# Patient Record
Sex: FEMALE | Race: WHITE
[De-identification: names, ages, dates, MRNs, and addresses within clinical notes are randomized per-mention and may not be internally consistent; named-entity substitution may affect disease eponyms.]

---

## 2017-02-24 ENCOUNTER — HOSPITAL ENCOUNTER (OUTPATIENT)
Dept: HOSPITAL 53 - M LAB | Age: 82
End: 2017-02-24
Attending: PHYSICIAN ASSISTANT
Payer: MEDICARE

## 2017-02-24 DIAGNOSIS — R60.0: Primary | ICD-10-CM

## 2017-02-24 LAB
ALBUMIN SERPL BCG-MCNC: 3.6 GM/DL (ref 3.2–5.2)
ANION GAP SERPL CALC-SCNC: 8 MEQ/L (ref 8–16)
BUN SERPL-MCNC: 29 MG/DL (ref 7–18)
CALCIUM SERPL-MCNC: 8.8 MG/DL (ref 8.8–10.2)
CHLORIDE SERPL-SCNC: 105 MEQ/L (ref 98–107)
CO2 SERPL-SCNC: 28 MEQ/L (ref 21–32)
CREAT SERPL-MCNC: 0.85 MG/DL (ref 0.55–1.02)
ERYTHROCYTE [DISTWIDTH] IN BLOOD BY AUTOMATED COUNT: 13.3 % (ref 11.5–14.5)
GFR SERPL CREATININE-BSD FRML MDRD: > 60 ML/MIN/{1.73_M2} (ref 32–?)
GLUCOSE SERPL-MCNC: 221 MG/DL (ref 83–110)
MCH RBC QN AUTO: 30.1 PG (ref 27–33)
MCHC RBC AUTO-ENTMCNC: 32 G/DL (ref 32–36.5)
MCV RBC AUTO: 94.1 FL (ref 80–96)
PHOSPHATE SERPL-MCNC: 3.5 MG/DL (ref 2.5–4.9)
PLATELET # BLD AUTO: 163 K/MM3 (ref 150–450)
POTASSIUM SERPL-SCNC: 4.4 MEQ/L (ref 3.5–5.1)
SODIUM SERPL-SCNC: 141 MEQ/L (ref 136–145)
WBC # BLD AUTO: 7.8 K/MM3 (ref 4–10)

## 2017-02-24 NOTE — REP
Clinical:  Edema .

 

Comparison: 07/12/2016 .

 

Technique:  PA and lateral.

 

Findings:

The cardiac silhouette is normal.  Evidence of prior sternotomy and CABG.  The

lung fields demonstrate diffuse chronic changes without acute consolidation,

effusion, or pneumothorax.  No definite evidence to suggest pulmonary vascular

congestion.  The skeletal structures are intact and normal.

 

Impression:

 Chronic changes.  No acute cardiopulmonary process.

 

 

Signed by

Tomasz Rollins MD 02/24/2017 01:19 P

## 2017-04-28 ENCOUNTER — HOSPITAL ENCOUNTER (OUTPATIENT)
Dept: HOSPITAL 53 - SKLAB3 | Age: 82
End: 2017-04-28
Attending: INTERNAL MEDICINE

## 2017-04-28 DIAGNOSIS — M25.551: ICD-10-CM

## 2017-04-28 DIAGNOSIS — E11.9: Primary | ICD-10-CM

## 2017-04-28 DIAGNOSIS — I10: ICD-10-CM

## 2017-04-28 LAB
ANION GAP SERPL CALC-SCNC: 8 MEQ/L (ref 8–16)
BUN SERPL-MCNC: 17 MG/DL (ref 7–18)
CALCIUM SERPL-MCNC: 8.1 MG/DL (ref 8.8–10.2)
CHLORIDE SERPL-SCNC: 102 MEQ/L (ref 98–107)
CO2 SERPL-SCNC: 28 MEQ/L (ref 21–32)
CREAT SERPL-MCNC: 0.73 MG/DL (ref 0.55–1.02)
ERYTHROCYTE [DISTWIDTH] IN BLOOD BY AUTOMATED COUNT: 14.2 % (ref 11.5–14.5)
EST. AVERAGE GLUCOSE BLD GHB EST-MCNC: 186 MG/DL (ref 60–110)
GFR SERPL CREATININE-BSD FRML MDRD: > 60 ML/MIN/{1.73_M2} (ref 32–?)
GLUCOSE SERPL-MCNC: 127 MG/DL (ref 83–110)
MCH RBC QN AUTO: 30 PG (ref 27–33)
MCHC RBC AUTO-ENTMCNC: 32.6 G/DL (ref 32–36.5)
MCV RBC AUTO: 92.1 FL (ref 80–96)
PLATELET # BLD AUTO: 235 K/MM3 (ref 150–450)
POTASSIUM SERPL-SCNC: 3.8 MEQ/L (ref 3.5–5.1)
SODIUM SERPL-SCNC: 138 MEQ/L (ref 136–145)
WBC # BLD AUTO: 7 K/MM3 (ref 4–10)

## 2017-04-28 NOTE — REP
RIGHT HIP, TWO VIEWS:

 

HISTORY:  Pain.

 

There is no acute fracture or dislocation.  There is narrowing of the joint space

with associated osteophyte formation.  There is sclerosis in the acetabulum.

 

IMPRESSION:

 

Degenerative change as described above.

 

 

Signed by

Braden Cordova MD 04/28/2017 03:50 P

## 2017-05-05 ENCOUNTER — HOSPITAL ENCOUNTER (OUTPATIENT)
Dept: HOSPITAL 53 - SKLAB3 | Age: 82
End: 2017-05-05
Attending: INTERNAL MEDICINE

## 2017-05-05 DIAGNOSIS — E11.9: ICD-10-CM

## 2017-05-05 DIAGNOSIS — I10: Primary | ICD-10-CM

## 2017-05-05 LAB
ANION GAP SERPL CALC-SCNC: 6 MEQ/L (ref 8–16)
BUN SERPL-MCNC: 23 MG/DL (ref 7–18)
CALCIUM SERPL-MCNC: 8.9 MG/DL (ref 8.8–10.2)
CHLORIDE SERPL-SCNC: 105 MEQ/L (ref 98–107)
CO2 SERPL-SCNC: 26 MEQ/L (ref 21–32)
CREAT SERPL-MCNC: 0.93 MG/DL (ref 0.55–1.02)
ERYTHROCYTE [DISTWIDTH] IN BLOOD BY AUTOMATED COUNT: 14.9 % (ref 11.5–14.5)
GFR SERPL CREATININE-BSD FRML MDRD: > 60 ML/MIN/{1.73_M2} (ref 32–?)
GLUCOSE SERPL-MCNC: 140 MG/DL (ref 83–110)
MCH RBC QN AUTO: 30.5 PG (ref 27–33)
MCHC RBC AUTO-ENTMCNC: 32.6 G/DL (ref 32–36.5)
MCV RBC AUTO: 93.7 FL (ref 80–96)
PLATELET # BLD AUTO: 236 K/MM3 (ref 150–450)
POTASSIUM SERPL-SCNC: 4.3 MEQ/L (ref 3.5–5.1)
SODIUM SERPL-SCNC: 137 MEQ/L (ref 136–145)
WBC # BLD AUTO: 7.9 K/MM3 (ref 4–10)

## 2017-05-09 ENCOUNTER — HOSPITAL ENCOUNTER (OUTPATIENT)
Dept: HOSPITAL 53 - M RAD | Age: 82
End: 2017-05-09
Attending: INTERNAL MEDICINE

## 2017-05-09 DIAGNOSIS — K21.9: ICD-10-CM

## 2017-05-09 DIAGNOSIS — K44.9: ICD-10-CM

## 2017-05-09 DIAGNOSIS — J38.7: ICD-10-CM

## 2017-05-09 DIAGNOSIS — R13.12: Primary | ICD-10-CM

## 2017-05-09 NOTE — REP
ESOPHAGRAM:

 

The procedure was performed under the direct supervision of Dr. Rabago. The images

were reviewed with Dr. Rabago.

 

A single view PA chest x-ray is submitted as a  film. There is no change

compared to a previous chest x-ray performed on 02/24/2017.

 

The exam is quite limited as the patient has a fractured left arm and is unable

to move around on the table or drink lying down.

 

Liquid barium was administered in the erect position. The oral and pharyngeal

stage of deglutition demonstrate penetration. During esophageal transport there

are tertiary waves demonstrated. There is no esophagitis, stricture or mucosal

ring. There is a hiatal hernia present. There is gastroesophageal reflux

demonstrated to the level of the lynn.

 

IMPRESSION:

 

The exam is limited due to patient mobility.

 

1. There is laryngeal penetration.

 

2. There are tertiary waves identified.

 

3. There is a hiatal hernia present. There is gastroesophageal reflux

demonstrated to the level of the lynn.

 

48 seconds of fluoroscopic time was utilized for this procedure.

 

 

Reviewed by

NICK Garay 05/10/2017 03:34 PEdited and Signed by

Matthew Rabago MD 05/10/2017 04:49 P

## 2017-05-12 ENCOUNTER — HOSPITAL ENCOUNTER (OUTPATIENT)
Dept: HOSPITAL 53 - SKLAB3 | Age: 82
End: 2017-05-12
Attending: INTERNAL MEDICINE

## 2017-05-12 DIAGNOSIS — E11.9: Primary | ICD-10-CM

## 2017-05-12 LAB
ANION GAP SERPL CALC-SCNC: 8 MEQ/L (ref 8–16)
BUN SERPL-MCNC: 19 MG/DL (ref 7–18)
CALCIUM SERPL-MCNC: 8.1 MG/DL (ref 8.8–10.2)
CHLORIDE SERPL-SCNC: 102 MEQ/L (ref 98–107)
CO2 SERPL-SCNC: 29 MEQ/L (ref 21–32)
CREAT SERPL-MCNC: 0.83 MG/DL (ref 0.55–1.02)
ERYTHROCYTE [DISTWIDTH] IN BLOOD BY AUTOMATED COUNT: 15.4 % (ref 11.5–14.5)
GFR SERPL CREATININE-BSD FRML MDRD: > 60 ML/MIN/{1.73_M2} (ref 32–?)
GLUCOSE SERPL-MCNC: 139 MG/DL (ref 83–110)
MCH RBC QN AUTO: 30.7 PG (ref 27–33)
MCHC RBC AUTO-ENTMCNC: 32 G/DL (ref 32–36.5)
MCV RBC AUTO: 96 FL (ref 80–96)
PLATELET # BLD AUTO: 169 K/MM3 (ref 150–450)
POTASSIUM SERPL-SCNC: 4.1 MEQ/L (ref 3.5–5.1)
SODIUM SERPL-SCNC: 139 MEQ/L (ref 136–145)
WBC # BLD AUTO: 5.5 K/MM3 (ref 4–10)

## 2017-05-19 ENCOUNTER — HOSPITAL ENCOUNTER (OUTPATIENT)
Dept: HOSPITAL 53 - SKLAB3 | Age: 82
End: 2017-05-19
Attending: INTERNAL MEDICINE

## 2017-05-19 DIAGNOSIS — I10: Primary | ICD-10-CM

## 2017-05-19 DIAGNOSIS — E11.9: ICD-10-CM

## 2017-05-19 LAB
ANION GAP SERPL CALC-SCNC: 5 MEQ/L (ref 8–16)
BUN SERPL-MCNC: 16 MG/DL (ref 7–18)
CALCIUM SERPL-MCNC: 8.7 MG/DL (ref 8.8–10.2)
CHLORIDE SERPL-SCNC: 103 MEQ/L (ref 98–107)
CO2 SERPL-SCNC: 31 MEQ/L (ref 21–32)
CREAT SERPL-MCNC: 0.79 MG/DL (ref 0.55–1.02)
ERYTHROCYTE [DISTWIDTH] IN BLOOD BY AUTOMATED COUNT: 15 % (ref 11.5–14.5)
GFR SERPL CREATININE-BSD FRML MDRD: > 60 ML/MIN/{1.73_M2} (ref 32–?)
GLUCOSE SERPL-MCNC: 156 MG/DL (ref 83–110)
MCH RBC QN AUTO: 30.7 PG (ref 27–33)
MCHC RBC AUTO-ENTMCNC: 31.5 G/DL (ref 32–36.5)
MCV RBC AUTO: 97.5 FL (ref 80–96)
PLATELET # BLD AUTO: 98 K/MM3 (ref 150–450)
POTASSIUM SERPL-SCNC: 5.2 MEQ/L (ref 3.5–5.1)
SODIUM SERPL-SCNC: 139 MEQ/L (ref 136–145)
WBC # BLD AUTO: 5.7 K/MM3 (ref 4–10)

## 2017-05-22 ENCOUNTER — HOSPITAL ENCOUNTER (OUTPATIENT)
Dept: HOSPITAL 53 - SKLAB3 | Age: 82
End: 2017-05-22
Attending: INTERNAL MEDICINE

## 2017-05-22 DIAGNOSIS — Z00.00: Primary | ICD-10-CM

## 2017-05-22 LAB
ANION GAP SERPL CALC-SCNC: 8 MEQ/L (ref 8–16)
BUN SERPL-MCNC: 15 MG/DL (ref 7–18)
CALCIUM SERPL-MCNC: 8.6 MG/DL (ref 8.8–10.2)
CHLORIDE SERPL-SCNC: 102 MEQ/L (ref 98–107)
CO2 SERPL-SCNC: 30 MEQ/L (ref 21–32)
CREAT SERPL-MCNC: 0.76 MG/DL (ref 0.55–1.02)
ERYTHROCYTE [DISTWIDTH] IN BLOOD BY AUTOMATED COUNT: 14.9 % (ref 11.5–14.5)
GFR SERPL CREATININE-BSD FRML MDRD: > 60 ML/MIN/{1.73_M2} (ref 32–?)
GLUCOSE SERPL-MCNC: 146 MG/DL (ref 83–110)
MCH RBC QN AUTO: 30.3 PG (ref 27–33)
MCHC RBC AUTO-ENTMCNC: 31.7 G/DL (ref 32–36.5)
MCV RBC AUTO: 95.5 FL (ref 80–96)
PLATELET # BLD AUTO: 125 K/MM3 (ref 150–450)
POTASSIUM SERPL-SCNC: 4.3 MEQ/L (ref 3.5–5.1)
SODIUM SERPL-SCNC: 140 MEQ/L (ref 136–145)
WBC # BLD AUTO: 5.6 K/MM3 (ref 4–10)

## 2017-05-24 ENCOUNTER — HOSPITAL ENCOUNTER (OUTPATIENT)
Dept: HOSPITAL 53 - SKLAB3 | Age: 82
End: 2017-05-24
Attending: INTERNAL MEDICINE
Payer: MEDICARE

## 2017-05-24 DIAGNOSIS — M89.9: ICD-10-CM

## 2017-05-24 DIAGNOSIS — M79.642: Primary | ICD-10-CM

## 2017-05-24 NOTE — REP
Clinical:  Pain.

 

Technique:  Portable AP lateral and bilateral oblique views of the fourth and

fifth digits.

 

Findings:

Examination is severely limited due to overlying cast material.  Underlying

osteopenia and advanced arthritic degenerative changes are appreciated.  Further

evaluation is limited.

 

Impression:

Significantly limited examination demonstrates osteopenia and advanced

degenerative changes.

 

 

Signed by

Tomasz Rollins MD 05/24/2017 03:36 P

## 2017-06-19 ENCOUNTER — HOSPITAL ENCOUNTER (OUTPATIENT)
Dept: HOSPITAL 53 - SKLAB3 | Age: 82
End: 2017-06-19
Attending: INTERNAL MEDICINE

## 2017-06-19 DIAGNOSIS — E11.9: ICD-10-CM

## 2017-06-19 DIAGNOSIS — D64.9: Primary | ICD-10-CM

## 2017-06-19 DIAGNOSIS — E87.5: ICD-10-CM

## 2017-06-19 LAB
ANION GAP SERPL CALC-SCNC: 8 MEQ/L (ref 8–16)
BUN SERPL-MCNC: 17 MG/DL (ref 7–18)
CALCIUM SERPL-MCNC: 9.1 MG/DL (ref 8.8–10.2)
CHLORIDE SERPL-SCNC: 103 MEQ/L (ref 98–107)
CO2 SERPL-SCNC: 27 MEQ/L (ref 21–32)
CREAT SERPL-MCNC: 0.93 MG/DL (ref 0.55–1.02)
ERYTHROCYTE [DISTWIDTH] IN BLOOD BY AUTOMATED COUNT: 14.5 % (ref 11.5–14.5)
GFR SERPL CREATININE-BSD FRML MDRD: > 60 ML/MIN/{1.73_M2} (ref 32–?)
GLUCOSE SERPL-MCNC: 275 MG/DL (ref 83–110)
MCH RBC QN AUTO: 31.3 PG (ref 27–33)
MCHC RBC AUTO-ENTMCNC: 32.8 G/DL (ref 32–36.5)
MCV RBC AUTO: 95.6 FL (ref 80–96)
PLATELET # BLD AUTO: 148 K/MM3 (ref 150–450)
POTASSIUM SERPL-SCNC: 4.3 MEQ/L (ref 3.5–5.1)
SODIUM SERPL-SCNC: 138 MEQ/L (ref 136–145)
WBC # BLD AUTO: 6.6 K/MM3 (ref 4–10)

## 2017-07-05 ENCOUNTER — HOSPITAL ENCOUNTER (OUTPATIENT)
Dept: HOSPITAL 53 - M PAIN | Age: 82
End: 2017-07-05
Attending: NURSE PRACTITIONER
Payer: MEDICARE

## 2017-07-05 DIAGNOSIS — Z53.29: Primary | ICD-10-CM

## 2017-08-14 ENCOUNTER — HOSPITAL ENCOUNTER (EMERGENCY)
Dept: HOSPITAL 53 - M ED | Age: 82
Discharge: HOME | End: 2017-08-14
Payer: MEDICARE

## 2017-08-14 VITALS — HEIGHT: 66 IN | BODY MASS INDEX: 32.53 KG/M2 | WEIGHT: 202.38 LBS

## 2017-08-14 VITALS — DIASTOLIC BLOOD PRESSURE: 68 MMHG | SYSTOLIC BLOOD PRESSURE: 144 MMHG

## 2017-08-14 DIAGNOSIS — I51.9: ICD-10-CM
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
## 2017-08-14 DIAGNOSIS — Z86.73: ICD-10-CM

## 2017-08-14 DIAGNOSIS — Z79.4: ICD-10-CM

## 2017-08-14 DIAGNOSIS — E11.9: ICD-10-CM

## 2017-08-14 DIAGNOSIS — I10: ICD-10-CM
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
## 2017-08-14 DIAGNOSIS — Z88.8: ICD-10-CM

## 2017-08-14 DIAGNOSIS — Z88.0: ICD-10-CM
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
## 2017-08-14 DIAGNOSIS — S50.12XA: Primary | ICD-10-CM

## 2017-08-14 DIAGNOSIS — Z79.82: ICD-10-CM

## 2017-08-14 DIAGNOSIS — Z79.899: ICD-10-CM

## 2017-08-14 DIAGNOSIS — S40.022A: ICD-10-CM

## 2017-08-14 DIAGNOSIS — Z85.51: ICD-10-CM

## 2017-08-14 DIAGNOSIS — Z96.622: ICD-10-CM

## 2017-08-14 NOTE — REP
Left elbow for views:

 

There is a total elbow arthroplasty, the components in satisfactory positions

alignment on all views.

 

There is no fracture or dislocation.  There are no calcifications or foreign

bodies.  There is diffuse demineralization.

 

 

Signed by

Matthew Jules MD 08/14/2017 12:42 P

## 2017-08-14 NOTE — REP
Left forearm two views:

 

There is an elbow arthroplasty.  There is diffuse demineralization.

 

There is no acute fracture or dislocation.

 

 

Signed by

Matthew Jules MD 08/14/2017 12:41 P

## 2017-08-14 NOTE — REP
Left humerus two views:

 

There is an elbow arthroplasty.

 

There is no evidence of loosening.  There is no fracture or dislocation.  There

is diffuse demineralization.

 

Impression:

 

No fracture or dislocation.  Elbow arthroplasty.

 

 

Signed by

Matthew Jules MD 08/14/2017 12:40 P

## 2017-12-27 ENCOUNTER — HOSPITAL ENCOUNTER (EMERGENCY)
Dept: HOSPITAL 53 - M ED | Age: 82
LOS: 1 days | Discharge: HOME | End: 2017-12-28
Payer: MEDICARE

## 2017-12-27 DIAGNOSIS — Z79.82: ICD-10-CM

## 2017-12-27 DIAGNOSIS — R60.0: ICD-10-CM

## 2017-12-27 DIAGNOSIS — Z88.0: ICD-10-CM

## 2017-12-27 DIAGNOSIS — I69.392: ICD-10-CM

## 2017-12-27 DIAGNOSIS — M19.011: Primary | ICD-10-CM

## 2017-12-27 DIAGNOSIS — G89.29: ICD-10-CM

## 2017-12-27 DIAGNOSIS — Z79.4: ICD-10-CM

## 2017-12-27 DIAGNOSIS — E11.9: ICD-10-CM

## 2017-12-27 DIAGNOSIS — E78.5: ICD-10-CM

## 2017-12-27 DIAGNOSIS — Z79.899: ICD-10-CM

## 2017-12-27 DIAGNOSIS — K21.9: ICD-10-CM

## 2017-12-27 DIAGNOSIS — Z88.8: ICD-10-CM

## 2017-12-27 DIAGNOSIS — I10: ICD-10-CM

## 2017-12-27 PROCEDURE — 73030 X-RAY EXAM OF SHOULDER: CPT

## 2017-12-28 RX ADMIN — HYDROCODONE BITARTRATE AND ACETAMINOPHEN 1 TAB: 5; 325 TABLET ORAL at 01:10

## 2018-01-17 ENCOUNTER — HOSPITAL ENCOUNTER (OUTPATIENT)
Dept: HOSPITAL 53 - M RAD | Age: 83
End: 2018-01-17
Attending: NURSE PRACTITIONER
Payer: MEDICARE

## 2018-01-17 DIAGNOSIS — D49.89: Primary | ICD-10-CM

## 2018-01-17 PROCEDURE — 78306 BONE IMAGING WHOLE BODY: CPT

## 2018-04-24 ENCOUNTER — HOSPITAL ENCOUNTER (EMERGENCY)
Dept: HOSPITAL 53 - M ED | Age: 83
Discharge: HOME | End: 2018-04-24
Payer: MEDICARE

## 2018-04-24 DIAGNOSIS — I50.9: ICD-10-CM

## 2018-04-24 DIAGNOSIS — Z87.19: ICD-10-CM

## 2018-04-24 DIAGNOSIS — M17.11: Primary | ICD-10-CM

## 2018-04-24 DIAGNOSIS — Z79.899: ICD-10-CM

## 2018-04-24 DIAGNOSIS — I11.0: ICD-10-CM

## 2018-04-24 DIAGNOSIS — I25.2: ICD-10-CM

## 2018-04-24 DIAGNOSIS — Z79.82: ICD-10-CM

## 2018-04-24 DIAGNOSIS — Z88.0: ICD-10-CM

## 2018-04-24 DIAGNOSIS — Z86.73: ICD-10-CM

## 2018-04-24 DIAGNOSIS — Z98.890: ICD-10-CM

## 2018-04-24 DIAGNOSIS — E11.9: ICD-10-CM

## 2018-04-24 DIAGNOSIS — Z88.8: ICD-10-CM

## 2018-04-24 DIAGNOSIS — Z79.4: ICD-10-CM

## 2018-04-24 LAB
ANION GAP: 5 MEQ/L (ref 8–16)
BLOOD UREA NITROGEN: 22 MG/DL (ref 7–18)
CALCIUM LEVEL: 8.9 MG/DL (ref 8.8–10.2)
CARBON DIOXIDE LEVEL: 28 MEQ/L (ref 21–32)
CHLORIDE LEVEL: 105 MEQ/L (ref 98–107)
CREATININE FOR GFR: 0.86 MG/DL (ref 0.55–1.3)
GFR SERPL CREATININE-BSD FRML MDRD: > 60 ML/MIN/{1.73_M2} (ref 32–?)
GLUCOSE, FASTING: 192 MG/DL (ref 70–100)
HEMATOCRIT: 35.4 % (ref 36–47)
HEMOGLOBIN: 11.6 G/DL (ref 12–15.5)
INR: 1.07
MEAN CORPUSCULAR HEMOGLOBIN: 30.8 PG (ref 27–33)
MEAN CORPUSCULAR HGB CONC: 32.8 G/DL (ref 32–36.5)
MEAN CORPUSCULAR VOLUME: 93.9 FL (ref 80–96)
NRBC BLD AUTO-RTO: 0 % (ref 0–0)
PLATELET COUNT, AUTOMATED: (no result) 10^3/UL (ref 150–450)
POS COUNT: (no result)
POTASSIUM SERUM: 4.1 MEQ/L (ref 3.5–5.1)
PROTHROMBIN TIME: 14 SECONDS (ref 12.4–14.5)
RED BLOOD COUNT: 3.77 10^6/UL (ref 4–5.4)
RED CELL DISTRIBUTION WIDTH: 13.5 % (ref 11.5–14.5)
SODIUM LEVEL: 138 MEQ/L (ref 136–145)
WHITE BLOOD COUNT: 8.1 10^3/UL (ref 4–10)

## 2018-04-24 PROCEDURE — 73564 X-RAY EXAM KNEE 4 OR MORE: CPT

## 2018-04-24 RX ADMIN — ACETAMINOPHEN 1 MG: 325 TABLET ORAL at 07:38

## 2018-06-26 ENCOUNTER — HOSPITAL ENCOUNTER (INPATIENT)
Dept: HOSPITAL 53 - M ED | Age: 83
LOS: 4 days | Discharge: HOME | DRG: 689 | End: 2018-06-30
Attending: INTERNAL MEDICINE
Payer: MEDICARE

## 2018-06-26 DIAGNOSIS — Z88.0: ICD-10-CM

## 2018-06-26 DIAGNOSIS — Z79.82: ICD-10-CM

## 2018-06-26 DIAGNOSIS — E11.9: ICD-10-CM

## 2018-06-26 DIAGNOSIS — Z79.899: ICD-10-CM

## 2018-06-26 DIAGNOSIS — N39.0: Primary | ICD-10-CM

## 2018-06-26 DIAGNOSIS — Z88.8: ICD-10-CM

## 2018-06-26 DIAGNOSIS — G93.41: ICD-10-CM

## 2018-06-26 DIAGNOSIS — N28.1: ICD-10-CM

## 2018-06-26 DIAGNOSIS — K21.9: ICD-10-CM

## 2018-06-26 DIAGNOSIS — G89.29: ICD-10-CM

## 2018-06-26 DIAGNOSIS — K57.30: ICD-10-CM

## 2018-06-26 DIAGNOSIS — I10: ICD-10-CM

## 2018-06-26 DIAGNOSIS — Z96.622: ICD-10-CM

## 2018-06-26 DIAGNOSIS — E78.5: ICD-10-CM

## 2018-06-26 DIAGNOSIS — M19.90: ICD-10-CM

## 2018-06-26 DIAGNOSIS — M10.9: ICD-10-CM

## 2018-06-26 DIAGNOSIS — Z79.4: ICD-10-CM

## 2018-06-26 LAB
ALBUMIN/GLOBULIN RATIO: 0.87 (ref 1–1.93)
ALBUMIN: 3.3 GM/DL (ref 3.2–5.2)
ALKALINE PHOSPHATASE: 103 U/L (ref 45–117)
ALT SERPL W P-5'-P-CCNC: 20 U/L (ref 12–78)
ANION GAP: 9 MEQ/L (ref 8–16)
AST SERPL-CCNC: 14 U/L (ref 7–37)
BASO #: 0 10^3/UL (ref 0–0.2)
BASO %: 0.4 % (ref 0–1)
BILIRUB CONJ SERPL-MCNC: 0.1 MG/DL (ref 0–0.2)
BILIRUBIN,TOTAL: 0.4 MG/DL (ref 0.2–1)
BLOOD UREA NITROGEN: 23 MG/DL (ref 7–18)
CALCIUM LEVEL: 8.5 MG/DL (ref 8.8–10.2)
CARBON DIOXIDE LEVEL: 26 MEQ/L (ref 21–32)
CHLORIDE LEVEL: 105 MEQ/L (ref 98–107)
CK MB CFR.DF SERPL CALC: 3.06
CK SERPL-CCNC: 62 U/L (ref 26–192)
CK-MB VALUE MASS: 1.9 NG/ML (ref ?–3.6)
CREATININE FOR GFR: 0.95 MG/DL (ref 0.55–1.3)
EOS #: 0.1 10^3/UL (ref 0–0.5)
EOSINOPHIL NFR BLD AUTO: 0.9 % (ref 0–3)
GFR SERPL CREATININE-BSD FRML MDRD: 60 ML/MIN/{1.73_M2} (ref 32–?)
GLUCOSE, FASTING: 245 MG/DL (ref 70–100)
HEMATOCRIT: 36.1 % (ref 36–47)
HEMOGLOBIN: 12 G/DL (ref 12–15.5)
IMMATURE GRANULOCYTE %: 0.3 % (ref 0–3)
LEUKOCYTE ESTERASE UR AUTO RFX: (no result)
LIPASE: 130 U/L (ref 73–393)
LYMPH #: 1.9 10^3/UL (ref 1.5–4.5)
LYMPH %: 18.1 % (ref 24–44)
MEAN CORPUSCULAR HEMOGLOBIN: 31.1 PG (ref 27–33)
MEAN CORPUSCULAR HGB CONC: 33.2 G/DL (ref 32–36.5)
MEAN CORPUSCULAR VOLUME: 93.5 FL (ref 80–96)
MONO #: 0.9 10^3/UL (ref 0–0.8)
MONO %: 8.5 % (ref 0–5)
NEUTROPHILS #: 7.6 10^3/UL (ref 1.8–7.7)
NEUTROPHILS %: 71.8 % (ref 36–66)
NRBC BLD AUTO-RTO: 0 % (ref 0–0)
PLATELET COUNT, AUTOMATED: 150 10^3/UL (ref 150–450)
POTASSIUM SERUM: 4.9 MEQ/L (ref 3.5–5.1)
RED BLOOD COUNT: 3.86 10^6/UL (ref 4–5.4)
RED CELL DISTRIBUTION WIDTH: 13.9 % (ref 11.5–14.5)
SODIUM LEVEL: 140 MEQ/L (ref 136–145)
SPECIFIC GRAVITY UR AUTO RFX: 1.02 (ref 1–1.03)
SQUAM EPITHELIAL CELL UR AURFX: 0 /HPF (ref 0–6)
TOTAL PROTEIN: 7.1 GM/DL (ref 6.4–8.2)
TROPONIN I: < 0.02 NG/ML (ref ?–0.1)
WHITE BLOOD COUNT: 10.6 10^3/UL (ref 4–10)

## 2018-06-26 RX ADMIN — CIPROFLOXACIN 1 MLS/HR: 2 INJECTION, SOLUTION INTRAVENOUS at 21:34

## 2018-06-26 RX ADMIN — SODIUM CHLORIDE 1 MLS/HR: 9 INJECTION, SOLUTION INTRAVENOUS at 19:37

## 2018-06-26 RX ADMIN — SODIUM CHLORIDE 1 MLS/HR: 9 INJECTION, SOLUTION INTRAVENOUS at 16:45

## 2018-06-26 RX ADMIN — SODIUM CHLORIDE 1 MLS/HR: 9 INJECTION, SOLUTION INTRAVENOUS at 16:31

## 2018-06-26 RX ADMIN — ACETAMINOPHEN 1 MG: 325 TABLET ORAL at 18:25

## 2018-06-27 LAB
ANION GAP: 9 MEQ/L (ref 8–16)
BASO #: 0 10^3/UL (ref 0–0.2)
BASO %: 0.2 % (ref 0–1)
BLOOD UREA NITROGEN: 17 MG/DL (ref 7–18)
CALCIUM LEVEL: 8.6 MG/DL (ref 8.8–10.2)
CARBON DIOXIDE LEVEL: 22 MEQ/L (ref 21–32)
CHLORIDE LEVEL: 108 MEQ/L (ref 98–107)
CK SERPL-CCNC: 71 U/L (ref 26–192)
CREATININE FOR GFR: 0.8 MG/DL (ref 0.55–1.3)
EOS #: 0.1 10^3/UL (ref 0–0.5)
EOSINOPHIL NFR BLD AUTO: 0.7 % (ref 0–3)
GFR SERPL CREATININE-BSD FRML MDRD: > 60 ML/MIN/{1.73_M2} (ref 32–?)
GLUCOSE BLDC GLUCOMTR-MCNC: 184 MG/DL (ref 83–110)
GLUCOSE BLDC GLUCOMTR-MCNC: 186 MG/DL (ref 83–110)
GLUCOSE BLDC GLUCOMTR-MCNC: 196 MG/DL (ref 83–110)
GLUCOSE, FASTING: 169 MG/DL (ref 70–100)
HEMATOCRIT: 34.6 % (ref 36–47)
HEMOGLOBIN: 11.4 G/DL (ref 12–15.5)
IMMATURE GRANULOCYTE %: 0.2 % (ref 0–3)
LYMPH #: 2 10^3/UL (ref 1.5–4.5)
LYMPH %: 23.5 % (ref 24–44)
MEAN CORPUSCULAR HEMOGLOBIN: 31.2 PG (ref 27–33)
MEAN CORPUSCULAR HGB CONC: 32.9 G/DL (ref 32–36.5)
MEAN CORPUSCULAR VOLUME: 94.8 FL (ref 80–96)
MONO #: 0.8 10^3/UL (ref 0–0.8)
MONO %: 9.6 % (ref 0–5)
NEUTROPHILS #: 5.6 10^3/UL (ref 1.8–7.7)
NEUTROPHILS %: 65.8 % (ref 36–66)
NRBC BLD AUTO-RTO: 0 % (ref 0–0)
PLATELET COUNT, AUTOMATED: 123 10^3/UL (ref 150–450)
POTASSIUM SERUM: 4.1 MEQ/L (ref 3.5–5.1)
RED BLOOD COUNT: 3.65 10^6/UL (ref 4–5.4)
RED CELL DISTRIBUTION WIDTH: 13.7 % (ref 11.5–14.5)
SODIUM LEVEL: 139 MEQ/L (ref 136–145)
WHITE BLOOD COUNT: 8.5 10^3/UL (ref 4–10)

## 2018-06-27 RX ADMIN — SODIUM CHLORIDE 1 UNITS: 4.5 INJECTION, SOLUTION INTRAVENOUS at 06:04

## 2018-06-27 RX ADMIN — LOSARTAN POTASSIUM 1 MG: 50 TABLET, FILM COATED ORAL at 08:42

## 2018-06-27 RX ADMIN — Medication 1 EA: at 08:24

## 2018-06-27 RX ADMIN — OMEPRAZOLE 1 MG: 20 CAPSULE, DELAYED RELEASE ORAL at 08:41

## 2018-06-27 RX ADMIN — ALLOPURINOL 1 MG: 300 TABLET ORAL at 08:42

## 2018-06-27 RX ADMIN — ACETAMINOPHEN 1 MG: 325 TABLET ORAL at 03:18

## 2018-06-27 RX ADMIN — NYSTATIN 1 DOSE: 100000 POWDER TOPICAL at 20:18

## 2018-06-27 RX ADMIN — SODIUM CHLORIDE 1 MLS/HR: 9 INJECTION, SOLUTION INTRAVENOUS at 00:41

## 2018-06-27 RX ADMIN — SODIUM CHLORIDE 1 UNITS: 4.5 INJECTION, SOLUTION INTRAVENOUS at 20:17

## 2018-06-27 RX ADMIN — PHENAZOPYRIDINE HYDROCHLORIDE 1 MG: 100 TABLET ORAL at 00:40

## 2018-06-27 RX ADMIN — INSULIN DETEMIR 1 UNITS: 100 INJECTION, SOLUTION SUBCUTANEOUS at 08:41

## 2018-06-27 RX ADMIN — CIPROFLOXACIN 1 MLS/HR: 2 INJECTION, SOLUTION INTRAVENOUS at 08:42

## 2018-06-27 RX ADMIN — CIPROFLOXACIN 1 MLS/HR: 2 INJECTION, SOLUTION INTRAVENOUS at 20:18

## 2018-06-27 RX ADMIN — SODIUM CHLORIDE 1 UNITS: 4.5 INJECTION, SOLUTION INTRAVENOUS at 15:23

## 2018-06-27 RX ADMIN — OMEPRAZOLE 1 MG: 20 CAPSULE, DELAYED RELEASE ORAL at 20:18

## 2018-06-27 RX ADMIN — ATORVASTATIN CALCIUM 1 MG: 20 TABLET, FILM COATED ORAL at 08:41

## 2018-06-27 RX ADMIN — MELOXICAM 1 MG: 7.5 TABLET ORAL at 08:42

## 2018-06-27 RX ADMIN — ACETAMINOPHEN 1 MG: 325 TABLET ORAL at 20:18

## 2018-06-28 LAB
GLUCOSE BLDC GLUCOMTR-MCNC: 242 MG/DL (ref 83–110)
GLUCOSE BLDC GLUCOMTR-MCNC: 244 MG/DL (ref 83–110)
GLUCOSE BLDC GLUCOMTR-MCNC: 272 MG/DL (ref 83–110)

## 2018-06-28 RX ADMIN — SODIUM CHLORIDE 1 UNITS: 4.5 INJECTION, SOLUTION INTRAVENOUS at 14:07

## 2018-06-28 RX ADMIN — OMEPRAZOLE 1 MG: 20 CAPSULE, DELAYED RELEASE ORAL at 20:00

## 2018-06-28 RX ADMIN — PROBIOTIC PRODUCT - TAB 1 EA: TAB at 14:07

## 2018-06-28 RX ADMIN — OMEPRAZOLE 1 MG: 20 CAPSULE, DELAYED RELEASE ORAL at 09:48

## 2018-06-28 RX ADMIN — ACETAMINOPHEN 1 MG: 325 TABLET ORAL at 04:32

## 2018-06-28 RX ADMIN — MELOXICAM 1 MG: 7.5 TABLET ORAL at 09:48

## 2018-06-28 RX ADMIN — ACETAMINOPHEN 1 MG: 325 TABLET ORAL at 19:59

## 2018-06-28 RX ADMIN — ATORVASTATIN CALCIUM 1 MG: 20 TABLET, FILM COATED ORAL at 09:48

## 2018-06-28 RX ADMIN — ALLOPURINOL 1 MG: 300 TABLET ORAL at 09:48

## 2018-06-28 RX ADMIN — SODIUM CHLORIDE 1 UNITS: 4.5 INJECTION, SOLUTION INTRAVENOUS at 19:59

## 2018-06-28 RX ADMIN — NYSTATIN 1 DOSE: 100000 POWDER TOPICAL at 09:49

## 2018-06-28 RX ADMIN — ACETAMINOPHEN 1 MG: 325 TABLET ORAL at 14:07

## 2018-06-28 RX ADMIN — PROBIOTIC PRODUCT - TAB 1 EA: TAB at 20:00

## 2018-06-28 RX ADMIN — Medication 1 EA: at 14:07

## 2018-06-28 RX ADMIN — INSULIN DETEMIR 1 UNITS: 100 INJECTION, SOLUTION SUBCUTANEOUS at 09:49

## 2018-06-28 RX ADMIN — NYSTATIN 1 DOSE: 100000 POWDER TOPICAL at 20:00

## 2018-06-28 RX ADMIN — LOSARTAN POTASSIUM 1 MG: 50 TABLET, FILM COATED ORAL at 09:48

## 2018-06-28 RX ADMIN — CEFTRIAXONE 1 MLS/HR: 1 INJECTION, POWDER, FOR SOLUTION INTRAMUSCULAR; INTRAVENOUS at 12:52

## 2018-06-28 RX ADMIN — Medication 1 EA: at 00:01

## 2018-06-28 RX ADMIN — SODIUM CHLORIDE 1 UNITS: 4.5 INJECTION, SOLUTION INTRAVENOUS at 05:39

## 2018-06-28 RX ADMIN — Medication 1 EA: at 20:00

## 2018-06-28 RX ADMIN — CEFTRIAXONE 1 MLS/HR: 1 INJECTION, POWDER, FOR SOLUTION INTRAMUSCULAR; INTRAVENOUS at 09:47

## 2018-06-29 LAB
GLUCOSE BLDC GLUCOMTR-MCNC: 184 MG/DL (ref 83–110)
GLUCOSE BLDC GLUCOMTR-MCNC: 197 MG/DL (ref 83–110)
GLUCOSE BLDC GLUCOMTR-MCNC: 228 MG/DL (ref 83–110)
GLUCOSE BLDC GLUCOMTR-MCNC: 253 MG/DL (ref 83–110)

## 2018-06-29 RX ADMIN — LIDOCAINE 1 PATCH: 50 PATCH CUTANEOUS at 03:33

## 2018-06-29 RX ADMIN — MELOXICAM 1 MG: 7.5 TABLET ORAL at 08:14

## 2018-06-29 RX ADMIN — ACETAMINOPHEN 1 MG: 325 TABLET ORAL at 08:14

## 2018-06-29 RX ADMIN — CEFTRIAXONE 1 MLS/HR: 1 INJECTION, POWDER, FOR SOLUTION INTRAMUSCULAR; INTRAVENOUS at 08:15

## 2018-06-29 RX ADMIN — NYSTATIN 1 DOSE: 100000 POWDER TOPICAL at 19:58

## 2018-06-29 RX ADMIN — ACETAMINOPHEN 1 MG: 325 TABLET ORAL at 00:46

## 2018-06-29 RX ADMIN — OMEPRAZOLE 1 MG: 20 CAPSULE, DELAYED RELEASE ORAL at 19:55

## 2018-06-29 RX ADMIN — Medication 1 EA: at 08:15

## 2018-06-29 RX ADMIN — Medication 1 EA: at 00:01

## 2018-06-29 RX ADMIN — ALLOPURINOL 1 MG: 300 TABLET ORAL at 08:15

## 2018-06-29 RX ADMIN — ACETAMINOPHEN 1 MG: 325 TABLET ORAL at 19:56

## 2018-06-29 RX ADMIN — INSULIN DETEMIR 1 UNITS: 100 INJECTION, SOLUTION SUBCUTANEOUS at 08:14

## 2018-06-29 RX ADMIN — SODIUM CHLORIDE 1 UNITS: 4.5 INJECTION, SOLUTION INTRAVENOUS at 14:00

## 2018-06-29 RX ADMIN — ATORVASTATIN CALCIUM 1 MG: 20 TABLET, FILM COATED ORAL at 08:14

## 2018-06-29 RX ADMIN — PROBIOTIC PRODUCT - TAB 1 EA: TAB at 19:56

## 2018-06-29 RX ADMIN — Medication 1: at 15:13

## 2018-06-29 RX ADMIN — SODIUM CHLORIDE 1 UNITS: 4.5 INJECTION, SOLUTION INTRAVENOUS at 05:46

## 2018-06-29 RX ADMIN — SODIUM CHLORIDE 1 UNITS: 4.5 INJECTION, SOLUTION INTRAVENOUS at 19:59

## 2018-06-29 RX ADMIN — LOSARTAN POTASSIUM 1 MG: 50 TABLET, FILM COATED ORAL at 08:14

## 2018-06-29 RX ADMIN — Medication 1 EA: at 19:57

## 2018-06-29 RX ADMIN — NYSTATIN 1 DOSE: 100000 POWDER TOPICAL at 08:15

## 2018-06-29 RX ADMIN — OMEPRAZOLE 1 MG: 20 CAPSULE, DELAYED RELEASE ORAL at 08:15

## 2018-06-29 RX ADMIN — PROBIOTIC PRODUCT - TAB 1 EA: TAB at 08:14

## 2018-06-30 LAB
GLUCOSE BLDC GLUCOMTR-MCNC: 184 MG/DL (ref 83–110)
GLUCOSE BLDC GLUCOMTR-MCNC: 227 MG/DL (ref 83–110)

## 2018-06-30 RX ADMIN — ATORVASTATIN CALCIUM 1 MG: 20 TABLET, FILM COATED ORAL at 09:59

## 2018-06-30 RX ADMIN — CEFTRIAXONE 1 MLS/HR: 1 INJECTION, POWDER, FOR SOLUTION INTRAMUSCULAR; INTRAVENOUS at 09:58

## 2018-06-30 RX ADMIN — SODIUM CHLORIDE 1 UNITS: 4.5 INJECTION, SOLUTION INTRAVENOUS at 05:54

## 2018-06-30 RX ADMIN — LIDOCAINE 1 PATCH: 50 PATCH CUTANEOUS at 10:00

## 2018-06-30 RX ADMIN — Medication 1 EA: at 10:00

## 2018-06-30 RX ADMIN — ACETAMINOPHEN 1 MG: 325 TABLET ORAL at 09:58

## 2018-06-30 RX ADMIN — Medication 1 EA: at 00:01

## 2018-06-30 RX ADMIN — MELOXICAM 1 MG: 7.5 TABLET ORAL at 09:59

## 2018-06-30 RX ADMIN — CEFTRIAXONE 1 MLS/HR: 1 INJECTION, POWDER, FOR SOLUTION INTRAMUSCULAR; INTRAVENOUS at 09:00

## 2018-06-30 RX ADMIN — ACETAMINOPHEN 1 MG: 325 TABLET ORAL at 05:54

## 2018-06-30 RX ADMIN — PROBIOTIC PRODUCT - TAB 1 EA: TAB at 09:58

## 2018-06-30 RX ADMIN — INSULIN DETEMIR 1 UNITS: 100 INJECTION, SOLUTION SUBCUTANEOUS at 09:59

## 2018-06-30 RX ADMIN — NYSTATIN 1 DOSE: 100000 POWDER TOPICAL at 09:00

## 2018-06-30 RX ADMIN — LOSARTAN POTASSIUM 1 MG: 50 TABLET, FILM COATED ORAL at 09:59

## 2018-06-30 RX ADMIN — ALLOPURINOL 1 MG: 300 TABLET ORAL at 09:57

## 2018-06-30 RX ADMIN — OMEPRAZOLE 1 MG: 20 CAPSULE, DELAYED RELEASE ORAL at 09:59

## 2018-08-30 ENCOUNTER — HOSPITAL ENCOUNTER (EMERGENCY)
Dept: HOSPITAL 53 - M ED | Age: 83
Discharge: HOME | End: 2018-08-30
Payer: MEDICARE

## 2018-08-30 DIAGNOSIS — M10.9: ICD-10-CM

## 2018-08-30 DIAGNOSIS — K21.9: ICD-10-CM

## 2018-08-30 DIAGNOSIS — Z85.848: ICD-10-CM

## 2018-08-30 DIAGNOSIS — K44.9: ICD-10-CM
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
## 2018-08-30 DIAGNOSIS — E11.9: ICD-10-CM

## 2018-08-30 DIAGNOSIS — Z87.19: ICD-10-CM

## 2018-08-30 DIAGNOSIS — S40.022A: Primary | ICD-10-CM
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
## 2018-08-30 DIAGNOSIS — Z96.622: ICD-10-CM

## 2018-08-30 DIAGNOSIS — Z96.652: ICD-10-CM

## 2018-08-30 DIAGNOSIS — S20.219A: ICD-10-CM

## 2018-08-30 DIAGNOSIS — I10: ICD-10-CM

## 2018-08-30 LAB
ANION GAP: 4 MEQ/L (ref 8–16)
BASO #: 0 10^3/UL (ref 0–0.2)
BASO %: 0.3 % (ref 0–1)
BLOOD UREA NITROGEN: 20 MG/DL (ref 7–18)
CALCIUM LEVEL: 8.7 MG/DL (ref 8.8–10.2)
CARBON DIOXIDE LEVEL: 30 MEQ/L (ref 21–32)
CHLORIDE LEVEL: 105 MEQ/L (ref 98–107)
CK MB CFR.DF SERPL CALC: 2.1
CK SERPL-CCNC: 76 U/L (ref 26–192)
CK-MB VALUE MASS: 1.6 NG/ML (ref ?–3.6)
CREATININE FOR GFR: 0.99 MG/DL (ref 0.55–1.3)
EOS #: 0.1 10^3/UL (ref 0–0.5)
EOSINOPHIL NFR BLD AUTO: 0.7 % (ref 0–3)
GFR SERPL CREATININE-BSD FRML MDRD: 57.2 ML/MIN/{1.73_M2} (ref 32–?)
GLUCOSE, FASTING: 240 MG/DL (ref 70–100)
HEMATOCRIT: 37.1 % (ref 36–47)
HEMOGLOBIN: 12.2 G/DL (ref 12–15.5)
IMMATURE GRANULOCYTE %: 0.3 % (ref 0–3)
LYMPH #: 1.8 10^3/UL (ref 1.5–4.5)
LYMPH %: 15.7 % (ref 24–44)
MEAN CORPUSCULAR HEMOGLOBIN: 31 PG (ref 27–33)
MEAN CORPUSCULAR HGB CONC: 32.9 G/DL (ref 32–36.5)
MEAN CORPUSCULAR VOLUME: 94.4 FL (ref 80–96)
MONO #: 0.9 10^3/UL (ref 0–0.8)
MONO %: 7.5 % (ref 0–5)
NEUTROPHILS #: 8.7 10^3/UL (ref 1.8–7.7)
NEUTROPHILS %: 75.5 % (ref 36–66)
NRBC BLD AUTO-RTO: 0 % (ref 0–0)
PLATELET COUNT, AUTOMATED: 121 10^3/UL (ref 150–450)
POTASSIUM SERUM: 4.4 MEQ/L (ref 3.5–5.1)
RED BLOOD COUNT: 3.93 10^6/UL (ref 4–5.4)
RED CELL DISTRIBUTION WIDTH: 13.8 % (ref 11.5–14.5)
SODIUM LEVEL: 139 MEQ/L (ref 136–145)
TROPONIN I: < 0.02 NG/ML (ref ?–0.1)
WHITE BLOOD COUNT: 11.5 10^3/UL (ref 4–10)

## 2018-08-30 RX ADMIN — MORPHINE SULFATE 1 MG: 2 INJECTION, SOLUTION INTRAMUSCULAR; INTRAVENOUS at 16:09

## 2018-11-20 ENCOUNTER — HOSPITAL ENCOUNTER (OUTPATIENT)
Dept: HOSPITAL 53 - M RAD | Age: 83
End: 2018-11-20
Attending: ORTHOPAEDIC SURGERY
Payer: MEDICARE

## 2018-11-20 DIAGNOSIS — M25.522: Primary | ICD-10-CM

## 2018-11-20 LAB
CRP SERPL-MCNC: 0.35 MG/DL (ref 0–0.3)
ERYTHROCYTE SEDIMENTATION RATE: 34 MM/HR (ref 0–30)

## 2018-11-20 PROCEDURE — 78315 BONE IMAGING 3 PHASE: CPT

## 2019-01-27 ENCOUNTER — HOSPITAL ENCOUNTER (INPATIENT)
Dept: HOSPITAL 53 - M ED | Age: 84
LOS: 18 days | Discharge: NURSING, REHAB OR LONG-TERM CARE | DRG: 64 | End: 2019-02-14
Attending: HOSPITALIST | Admitting: HOSPITALIST
Payer: MEDICARE

## 2019-01-27 VITALS — BODY MASS INDEX: 32.95 KG/M2 | WEIGHT: 207.45 LBS | HEIGHT: 66.5 IN

## 2019-01-27 DIAGNOSIS — Z79.899: ICD-10-CM

## 2019-01-27 DIAGNOSIS — N39.0: ICD-10-CM

## 2019-01-27 DIAGNOSIS — Z79.4: ICD-10-CM

## 2019-01-27 DIAGNOSIS — E78.5: ICD-10-CM

## 2019-01-27 DIAGNOSIS — K21.9: ICD-10-CM

## 2019-01-27 DIAGNOSIS — H53.461: ICD-10-CM

## 2019-01-27 DIAGNOSIS — E11.9: ICD-10-CM

## 2019-01-27 DIAGNOSIS — G51.0: ICD-10-CM

## 2019-01-27 DIAGNOSIS — I10: ICD-10-CM

## 2019-01-27 DIAGNOSIS — Z96.622: ICD-10-CM

## 2019-01-27 DIAGNOSIS — E43: ICD-10-CM

## 2019-01-27 DIAGNOSIS — M10.9: ICD-10-CM

## 2019-01-27 DIAGNOSIS — Z79.82: ICD-10-CM

## 2019-01-27 DIAGNOSIS — Z88.0: ICD-10-CM

## 2019-01-27 DIAGNOSIS — I63.532: Primary | ICD-10-CM

## 2019-01-27 DIAGNOSIS — Z88.8: ICD-10-CM

## 2019-01-27 LAB
APTT BLD: 27.6 SECONDS (ref 25.4–37.6)
BASOPHILS # BLD AUTO: 0 10^3/UL (ref 0–0.2)
BASOPHILS NFR BLD AUTO: 0.3 % (ref 0–1)
BUN SERPL-MCNC: 24 MG/DL (ref 7–18)
CALCIUM SERPL-MCNC: 8.7 MG/DL (ref 8.8–10.2)
CHLORIDE SERPL-SCNC: 105 MEQ/L (ref 98–107)
CK MB CFR.DF SERPL CALC: 3.56
CK MB SERPL-MCNC: 2 NG/ML (ref ?–3.6)
CK SERPL-CCNC: 45 U/L (ref 26–192)
CO2 SERPL-SCNC: 26 MEQ/L (ref 21–32)
CREAT SERPL-MCNC: 0.88 MG/DL (ref 0.55–1.3)
EOSINOPHIL # BLD AUTO: 0.1 10^3/UL (ref 0–0.5)
EOSINOPHIL NFR BLD AUTO: 0.7 % (ref 0–3)
GFR SERPL CREATININE-BSD FRML MDRD: > 60 ML/MIN/{1.73_M2} (ref 32–?)
GLUCOSE SERPL-MCNC: 183 MG/DL (ref 70–100)
HCT VFR BLD AUTO: 39.9 % (ref 36–47)
HGB BLD-MCNC: 13.1 G/DL (ref 12–15.5)
INR PPP: 1.07
LYMPHOCYTES # BLD AUTO: 2 10^3/UL (ref 1.5–4.5)
LYMPHOCYTES NFR BLD AUTO: 17.9 % (ref 24–44)
MCH RBC QN AUTO: 31.2 PG (ref 27–33)
MCHC RBC AUTO-ENTMCNC: 32.8 G/DL (ref 32–36.5)
MCV RBC AUTO: 95 FL (ref 80–96)
MONOCYTES # BLD AUTO: 0.9 10^3/UL (ref 0–0.8)
MONOCYTES NFR BLD AUTO: 8.1 % (ref 0–5)
NEUTROPHILS # BLD AUTO: 8 10^3/UL (ref 1.8–7.7)
NEUTROPHILS NFR BLD AUTO: 72.6 % (ref 36–66)
PLATELET # BLD AUTO: 145 10^3/UL (ref 150–450)
POTASSIUM SERPL-SCNC: 4.8 MEQ/L (ref 3.5–5.1)
PROTHROMBIN TIME: 14 SECONDS (ref 12.1–14.4)
RBC # BLD AUTO: 4.2 10^6/UL (ref 4–5.4)
SODIUM SERPL-SCNC: 138 MEQ/L (ref 136–145)
TROPONIN I SERPL-MCNC: < 0.02 NG/ML (ref ?–0.1)
WBC # BLD AUTO: 11.1 10^3/UL (ref 4–10)

## 2019-01-27 RX ADMIN — INSULIN LISPRO SCH UNITS: 100 INJECTION, SOLUTION INTRAVENOUS; SUBCUTANEOUS at 21:00

## 2019-01-27 NOTE — REP
Clinical:  Left-sided pain .

 

Comparison:  08/30/2018 .

 

Findings:

The mediastinum and cardiac silhouette are stable and within normal limits for

portable technique.  Evidence for prior sternotomy and CABG.  The lung fields

demonstrate chronic interstitial changes without acute consolidation, effusion,

or pneumothorax.  Skeletal structures demonstrate age-related osteopenia and

degenerative changes without obvious acute fracture.

 

Impression:

Chronic-appearing changes.  No obvious acute cardiopulmonary process.

 

 

Electronically Signed by

Tomasz Rollins MD 01/27/2019 08:41 P

## 2019-01-27 NOTE — REP
Clinical:  Weakness.

 

Comparison:  08/30/2018 .

 

Findings:

Age-related atrophy and microvascular ischemic changes are appreciated.  The

ventricles and sulci are symmetric.  Gray-white differentiation is maintained.

There is no evidence for acute intracranial hemorrhage, mass/mass effect,

pathology or infarction.  No extra-axial fluid collection.  Calvarium is intact.

Paranasal sinuses and mastoid air cells are clear.

 

Impression:

Age related atrophy and microvascular ischemic changes.

No acute intracranial hemorrhage, infarction, or mass/mass effect.

 

 

Electronically Signed by

Tomasz Rollins MD 01/27/2019 08:30 P

## 2019-01-27 NOTE — REP
Clinical:  Left elbow pain.

 

Technique:  AP, lateral, oblique views of the left elbow.

 

Comparison:  08/30/2018.

 

Findings:

Evidence for elbow replacement in stable position.  The osseous structures

demonstrate osteopenia and periarticular arthritic degenerative changes with

osteophytes and heterotopic bone formation essentially unchanged from prior

examination.  Extensive vascular calcifications are also identified.  No obvious

acute fracture or dislocation.  No obvious subcutaneous emphysema.

 

Impression:

Stable osteopenia and postsurgical/degenerative and post traumatic changes

similar to 08/30/2018.  No obvious acute fracture or dislocation.

 

 

Electronically Signed by

Tomasz Rollins MD 01/27/2019 09:19 P

## 2019-01-28 VITALS — DIASTOLIC BLOOD PRESSURE: 65 MMHG | SYSTOLIC BLOOD PRESSURE: 146 MMHG

## 2019-01-28 VITALS — SYSTOLIC BLOOD PRESSURE: 141 MMHG | DIASTOLIC BLOOD PRESSURE: 67 MMHG

## 2019-01-28 VITALS — DIASTOLIC BLOOD PRESSURE: 58 MMHG | SYSTOLIC BLOOD PRESSURE: 143 MMHG

## 2019-01-28 VITALS — DIASTOLIC BLOOD PRESSURE: 56 MMHG | SYSTOLIC BLOOD PRESSURE: 125 MMHG

## 2019-01-28 LAB
ALBUMIN SERPL BCG-MCNC: 3 GM/DL (ref 3.2–5.2)
ALT SERPL W P-5'-P-CCNC: 14 U/L (ref 12–78)
BILIRUB SERPL-MCNC: 0.6 MG/DL (ref 0.2–1)
BUN SERPL-MCNC: 20 MG/DL (ref 7–18)
CALCIUM SERPL-MCNC: 8.3 MG/DL (ref 8.8–10.2)
CHLORIDE SERPL-SCNC: 104 MEQ/L (ref 98–107)
CK MB CFR.DF SERPL CALC: 2.54
CK MB SERPL-MCNC: 2 NG/ML (ref ?–3.6)
CK SERPL-CCNC: 63 U/L (ref 26–192)
CO2 SERPL-SCNC: 25 MEQ/L (ref 21–32)
CREAT SERPL-MCNC: 0.69 MG/DL (ref 0.55–1.3)
GFR SERPL CREATININE-BSD FRML MDRD: > 60 ML/MIN/{1.73_M2} (ref 32–?)
GLUCOSE SERPL-MCNC: 205 MG/DL (ref 70–100)
HCT VFR BLD AUTO: 37.6 % (ref 36–47)
HGB BLD-MCNC: 12.3 G/DL (ref 12–15.5)
LIPASE SERPL-CCNC: 83 U/L (ref 73–393)
MCH RBC QN AUTO: 31.4 PG (ref 27–33)
MCHC RBC AUTO-ENTMCNC: 32.7 G/DL (ref 32–36.5)
MCV RBC AUTO: 95.9 FL (ref 80–96)
PLATELET # BLD AUTO: 130 10^3/UL (ref 150–450)
POTASSIUM SERPL-SCNC: 4.4 MEQ/L (ref 3.5–5.1)
PROT SERPL-MCNC: 6.3 GM/DL (ref 6.4–8.2)
RBC # BLD AUTO: 3.92 10^6/UL (ref 4–5.4)
SODIUM SERPL-SCNC: 138 MEQ/L (ref 136–145)
TROPONIN I SERPL-MCNC: < 0.02 NG/ML (ref ?–0.1)
WBC # BLD AUTO: 10.6 10^3/UL (ref 4–10)

## 2019-01-28 PROCEDURE — 02HV33Z INSERTION OF INFUSION DEVICE INTO SUPERIOR VENA CAVA, PERCUTANEOUS APPROACH: ICD-10-PCS | Performed by: RADIOLOGY

## 2019-01-28 RX ADMIN — INSULIN LISPRO SCH UNITS: 100 INJECTION, SOLUTION INTRAVENOUS; SUBCUTANEOUS at 17:01

## 2019-01-28 RX ADMIN — MORPHINE SULFATE PRN MG: 4 INJECTION INTRAVENOUS at 17:01

## 2019-01-28 RX ADMIN — NYSTATIN SCH DOSE: 100000 POWDER TOPICAL at 20:06

## 2019-01-28 RX ADMIN — SODIUM CHLORIDE SCH MLS/HR: 9 INJECTION, SOLUTION INTRAVENOUS at 00:32

## 2019-01-28 RX ADMIN — ACETAMINOPHEN PRN MG: 325 TABLET ORAL at 00:33

## 2019-01-28 RX ADMIN — INSULIN LISPRO SCH UNITS: 100 INJECTION, SOLUTION INTRAVENOUS; SUBCUTANEOUS at 12:02

## 2019-01-28 RX ADMIN — SODIUM CHLORIDE SCH UNITS: 4.5 INJECTION, SOLUTION INTRAVENOUS at 20:06

## 2019-01-28 RX ADMIN — ATORVASTATIN CALCIUM SCH MG: 20 TABLET, FILM COATED ORAL at 10:44

## 2019-01-28 RX ADMIN — INSULIN LISPRO SCH UNITS: 100 INJECTION, SOLUTION INTRAVENOUS; SUBCUTANEOUS at 07:30

## 2019-01-28 RX ADMIN — ACETAMINOPHEN PRN MG: 325 TABLET ORAL at 11:42

## 2019-01-28 RX ADMIN — SODIUM CHLORIDE SCH MLS/HR: 9 INJECTION, SOLUTION INTRAVENOUS at 22:27

## 2019-01-28 RX ADMIN — ONDANSETRON PRN MG: 2 INJECTION INTRAMUSCULAR; INTRAVENOUS at 08:23

## 2019-01-28 RX ADMIN — MORPHINE SULFATE PRN MG: 4 INJECTION INTRAVENOUS at 22:28

## 2019-01-28 RX ADMIN — NYSTATIN SCH DOSE: 100000 POWDER TOPICAL at 11:47

## 2019-01-28 RX ADMIN — ASPIRIN SCH MG: 81 TABLET ORAL at 10:44

## 2019-01-28 RX ADMIN — ALLOPURINOL SCH MG: 300 TABLET ORAL at 10:44

## 2019-01-28 RX ADMIN — INSULIN LISPRO SCH UNITS: 100 INJECTION, SOLUTION INTRAVENOUS; SUBCUTANEOUS at 20:06

## 2019-01-28 RX ADMIN — PANTOPRAZOLE SODIUM SCH MG: 40 TABLET, DELAYED RELEASE ORAL at 10:44

## 2019-01-28 RX ADMIN — CLOPIDOGREL BISULFATE SCH MG: 75 TABLET ORAL at 10:44

## 2019-01-28 NOTE — IPNPDOC
Date Seen


The patient was seen on 1/28/19.





Progress Note


SUBJECTIVE: Patient was seen and examined this morning. She continues have pain 

in her left above the patient does exhibit some discomfort with palpation of the

abdomen. Speech therapy has yet to come to evaluate at bedside at the time of 

exam. Daughter was in the room who is the healthcare proxy states that the day 

before the patient was doing her normal status and she's been sleeping all day 

and she was complaining of nausea. The daughter is also noticed that she had 

decreased by mouth intake in the last 24 hours unsure if this is contributing to

her current mental state. The patient does have dentures yet so she does not 

like to open her mouth a lot unsure if that attributes to her dictation change. 

See was originally seeing Dr. Friend management for her left elbow pain which is 

chronic in nature. She has had to get her MRI of the brain will also get a CT 

abdomen and pelvis with IV contrast and CT of the elbow without contrast to 

assess why her pain is portion. Started on antibiotics








OBJECTIVE


PHYSICAL EXAMINATION:


VITAL SIGNS: Please see below. 


GENERAL: Elderly woman, lying calmly in bed, not in any apparent distress. she 

is not pale, anicteric and afebrile


HEENT: Atraumatic. Neck: Supple.


LUNGS: Clear to auscultation bilaterally.


CARDIOVASCULAR: S1 and 2 heard, no murmurs, rubs or gallops.


ABDOMEN: Obese, soft, tender on palpation left lower quadrant. Hypoactive bowel 

sounds


MUSCULOSKELETAL: Apparently within normal limits. 


EXTREMITIES: No pedal edema, 2+ bilateral pedal pulses noted. Left arm range of 

motion limited due to pain in the elbow.


NEUROLOGICAL: Awake, alert she is dysarthric  Bell's palsy noted on the right 

side of face. Right lateral tongue deviation. Left upper extremity range of 

motion limited due to pain in the elbow. 








LABORATORY DATA, IMAGING STUDIES, MICROBIOLOGY: Please see below.








DVT prophylaxis ordered?: Yes TEDs and Sequential





ASSESSMENT AND PLAN: This is a 83-year-old woman who presented with Left upper 

extremity weakness and pain 1 day





PROBLEMS:





Left Elbow pain


-Has a history of left elbow replacements status post left elbow fracture


-imaging negative for fractures or DVT


-[id number]


-Tylenol plus K pad for pain management currently


-CT of the elbow with IV contrast


-If nothing is identified cause of worsening pain can consider pain management 

consult





TIA, rule out CVA.


-history of prior stroke?, Daughter is unsure


-Neurologic checks every 4 hours


-CT head - negative. 


-Pending:MRI, MRA, echocardiogram, carotid Dopplers.


-Neurology - consulted appreciate input. 


-Continue with ASA. currently on Plavix will await Neurology recommendation if 

this needs to be continued.


-c/w Lipitor 40mg





Left lower quadrant pain


-Physical exam positive for left quadrant pain and nausea


-Will obtain CT abdomen with IV contrast


-Will obtain labs such as lactic acid, urine and blood cultures, lipase, cardiac

markers


-We'll start her on Levaquin for gram-negative coverage de-escalate pending 

results





Hypertension


-permissive hypertension for 24-48 hours continue 50mls cc/hr for now and will 

discontinue once neurology makes recommendations. 


-will monitor





Hyperlipidemia


-c/w Lipitor 40mg





Diabetes


-c/w ISS





GERD


-c/w protonix





Gout


-c/w Allopurinol 





Right-sided facial paralysis 2/2 to history of Bell's palsy





DVT prophy: Hep SQ





VS, I&O, 24H, Fishbone


Vital Signs/I&O





Vital Signs








  Date Time  Temp Pulse Resp B/P (MAP) Pulse Ox O2 Delivery O2 Flow Rate FiO2


 


1/28/19 05:01    125/55 (78)    


 


1/28/19 04:55 97.8 78 20  95   


 


1/27/19 19:40      Room Air  











Laboratory Data


24H LABS


Laboratory Tests 2


1/27/19 21:08: 


Urine Color YELLOW, Urine Appearance HAZY, Urine pH 5.0, Urine Specific Gravity 

1.024, Urine Protein 1+H, Urine Glucose (UA) 1+H, Urine Ketones NEGATIVE, Urine 

Blood NEGATIVE, Urine Nitrite NEGATIVE, Urine Bilirubin NEGATIVE, Urine 

Urobilinogen 2.0H, Urine Leukocyte Esterase NEGATIVE, Urine WBC (Auto) 5H, Urine

RBC (Auto) 6H, Urine Hyaline Casts (Auto) 0, Urine Bacteria (Auto) 1+H, Urine 

Squamous Epithelial Cells 1, Urine Mucus (Auto) SMALL, Urine Sperm (Auto) 


1/27/19 21:11: Bedside Glucose (Misc Panel) 179H


1/27/19 21:15: 


Immature Granulocyte % (Auto) 0.4, White Blood Count 11.1H, Red Blood Count 

4.20, Hemoglobin 13.1, Hematocrit 39.9, Mean Corpuscular Volume 95.0, Mean 

Corpuscular Hemoglobin 31.2, Mean Corpuscular Hemoglobin Concent 32.8, Red Cell 

Distribution Width 13.6, Platelet Count 145L, Neutrophils (%) (Auto) 72.6H, 

Lymphocytes (%) (Auto) 17.9L, Monocytes (%) (Auto) 8.1H, Eosinophils (%) (Auto) 

0.7, Basophils (%) (Auto) 0.3, Neutrophils # (Auto) 8.0H, Lymphocytes # (Auto) 

2.0, Monocytes # (Auto) 0.9H, Eosinophils # (Auto) 0.1, Basophils # (Auto) 0.0, 

Nucleated Red Blood Cells % (auto) 0.0


1/27/19 22:01: 


Anion Gap 7L, Glomerular Filtration Rate > 60.0, Blood Urea Nitrogen 24H, 

Creatinine 0.88, Sodium Level 138, Potassium Level 4.8, Chloride Level 105, 

Carbon Dioxide Level 26, Calcium Level 8.7L, Total Creatine Kinase 45, Creatine 

Kinase MB 2.0, Creatine Kinase MB Relative Index 3.56, Troponin I < 0.02


1/27/19 23:03: 


Prothrombin Time 14.0, Prothromb Time International Ratio 1.07, Activated 

Partial Thromboplast Time 27.6


1/28/19 00:18: Bedside Glucose (Misc Panel) 168H


CBC/BMP


Laboratory Tests


1/27/19 21:15








Red Blood Count 4.20, Mean Corpuscular Volume 95.0, Mean Corpuscular Hemoglobin 

31.2, Mean Corpuscular Hemoglobin Concent 32.8, Red Cell Distribution Width 

13.6, Neutrophils (%) (Auto) 72.6 H, Lymphocytes (%) (Auto) 17.9 L, Monocytes 

(%) (Auto) 8.1 H, Eosinophils (%) (Auto) 0.7, Basophils (%) (Auto) 0.3, 

Neutrophils # (Auto) 8.0 H, Lymphocytes # (Auto) 2.0, Monocytes # (Auto) 0.9 H, 

Eosinophils # (Auto) 0.1, Basophils # (Auto) 0.0





1/27/19 22:01








Calcium Level 8.7 L, Total Creatine Kinase 45





GME ATTESTATION


GME ATTESTATION


My faculty preceptor for this patient encounter was physically present during 

the encounter and was fully available. All aspects of the patient interview, 

examination, medical decision making process, and medical care plan development 

were reviewed and approved by the faculty preceptor. The faculty preceptor is 

aware and concurs with the plan as stated in the body of this note and will 

attest to such by his/her cosignature.











DAO WORRELL DO       Jan 28, 2019 07:58


NÉSTOR ESTRADA MD                 Feb 10, 2019 14:21

## 2019-01-28 NOTE — REP
MRI of the brain without contrast:

 

History:  CVA.

 

Comparison CT study of the brain is from January 27, 2019.

 

Technique:  Axial and sagittal imaging planes are utilized for T1 and T2-weighted

scans.  Sequences include spin-echo, fast spin echo, FLAIR, and diffusion

weighted sequences.

 

MRI findings:  There is considerable motion artifact on several of the sequences

comparing image quality significantly.  However, there is no evidence of

intracranial hemorrhage.  Diffusion weighted sequences are adequate to diagnose

restricted diffusion pattern in the distribution of the right posterior cerebral

artery affecting the right occipital and right posterior temporal lobes as well

as a portion of the right thalamus consistent with acute infarction.  No

extra-axial fluid collection or midline shift is seen.  There is a small old

lacunar infarct in the head of the caudate nucleus on the right.

 

Impression:

 

Limited study due to motion artifact.  However, there is evidence of acute

ischemia on diffusion weighted sequences in the distribution of the right

posterior cerebral artery consistent with acute infarction.

 

 

Electronically Signed by

yRan Charles MD 01/28/2019 03:06 P

## 2019-01-28 NOTE — REP
MR angiography the brain without contrast:

 

History:  CVA.

 

Technique:  3-D time-of-flight MR angiography of the brain is acquired in the

usual fashion and maximal intensity projection images were generated in

rotational format about the vertical and horizontal axes.  In addition, source

axial T1-weighted images are viewed in cine mode.

 

MR angiographic findings: Examination is completely uninterpretable because of

motion artifact.

 

Impression:

 

Uninterpretable exam due to motion artifact.

 

 

Electronically Signed by

Ryan Charles MD 01/28/2019 02:04 P

## 2019-01-28 NOTE — REP
Clinical:  Left elbow pain.

 

Technique:  Axial noncontrast images through the left elbow with coronal and

sagittal re-formations.

 

Findings:

Evidence for prior elbow replacement causing significant beam-hardening artifact.

No obvious acute fracture or dislocation is appreciated.  The osseous structures

demonstrate osteopenia as well as heterotopic bone formation adjacent to the

residual proximal ulna.  The surrounding soft tissues suggest age-related

atrophy.  No obvious fluid collection.  Peripheral vascular disease noted.

 

Impression:

No acute fracture.

No obvious fluid collection.

 

 

Electronically Signed by

Tomasz Rollins MD 01/28/2019 03:00 P

## 2019-01-28 NOTE — REP
Clinical:  Cerebrovascular accident .

 

Technique:  Gray scale and color Doppler evaluation using linear high frequency

transducer

 

Findings:

Two-dimensional gray scale and color images demonstrate smooth intimal thickening

and atheromatous plaquing through the bilateral common carotid arteries extending

to the carotid bulbs where moderate amounts of mixed partially calcified plaquing

extending to the proximal internal carotid arteries causes visible narrowing.

Color Doppler interrogation demonstrates normal arterial wave patterns and

velocities with moderate spectral broadening.  Vertebral bodies are not

identified.

 

                                              RIGHT (cm/s)         LEFT (cm/s)

 

ICA peak systolic velocity            83.2             105.0

ICA diastolic velocity                  9.2                    7.2

ECA peak systolic velocity           217.6                          118.2

CCA peak systolic velocity           33.1                           85.4

ICA/CCA ratio                           2.5                           1.2

 

Impression:

Mixed atheromatous plaquing noted primarily involving the bilateral carotid bulbs

and proximal internal carotid arteries.  Although velocities are not elevated,

visible narrowing of approximately 50-69% range is suggested bilaterally.

Sonographic evaluation was limited due to body habitus and CTA or MRA should be

considered for more definitive evaluation.

 

 

Electronically Signed by

Tomasz Rollins MD 01/28/2019 08:42 A

## 2019-01-28 NOTE — REP
Clinical:  Diffuse abdominal pain.

 

Technique:  Axial contrast enhanced images from the lung bases to the pubic

symphysis with coronal and sagittal re-formations using 100 ml Isovue 370

intravenous contrast material.

 

Comparison:  06/26/2018.

 

Findings:

Lung bases demonstrate chronic changes and mild pulmonary vascular congestion.

Cardiomegaly noted.

 

Liver, spleen, atrophic pancreas, bilateral adrenal glands are normal.  Kidneys

demonstrate mild cortical scarring and hypodensities suggesting cysts including 3

cm left renal cyst.  The gallbladder is distended but without evidence for acute

cholecystitis.  The enteric system demonstrates diverticulosis without acute

diverticulitis and no evidence for bowel obstruction or acute inflammatory

process.  Pelvis demonstrates normal bladder and age-appropriate uterus/adnexa.

No ascites.  No free air.  No adenopathy.  Abdominal aorta and vasculature

demonstrates atherosclerotic disease without aneurysm.  Musculoskeletal

structures demonstrate osteopenia and degenerative change.

 

Impression:

No acute abdominopelvic pathology appreciated.

 

 

Electronically Signed by

Tomasz Rollins MD 01/28/2019 02:56 P

## 2019-01-28 NOTE — ECHO
DATE OF PROCEDURE:  01/28/2019

 

AGE:  83.

GENDER:  Female.

Height 67 inches.

Weight 216 pounds.

Body surface area 2.08 m2.

Location:  Inpatient intensive care unit (ICU), room 3208.

 

REFERRING PHYSICIAN:  Dr. Doshi

 

INDICATIONS:  CVA, question cardiac source.

 

MEASUREMENTS:

2-D measurements:

RV - 3.2 cm

LV - 4.6 cm

Septum 1.0 cm

Posterior wall 1.0 cm

Aortic root 3.4 cm

LA - 3.7 cm

LVEF of 65%.

 

Doppler measurements:

AV - 1.3 m/s

LVOT: 0.75 m/s

LVOT diameter 2.1 cm

MVE:  77

A:  84

E/A ratio:  0.9

 

Early mitral deceleration time 194 milliseconds.

E prime 6, A prime 12, E/E prime ratio 12.8.

PCWP 13.8 mmHg

PV - 0.8 m/s

Pulmonary artery acceleration time 99 milliseconds.

RVSP 46 mmHg

IVC - 2.3 cm

 

COMMENT:

Normal sinus rhythm without intraventricular conduction disturbance.

 

Technically challenging study in light of the patient's body habitus but

diagnostically useful information was still obtained.

 

M-mode and two-dimensional echocardiography was performed with pulsed, 
continuous

wave, color flow, and tissue Doppler studies.

 

Normal left ventricular size, wall thickness and wall motion.

 

Left atrium upper limits of normal in size with an impairment of LV diastolic

function but currently normal estimated mean left atrial pressure (likely within

normal limits for age).

 

Normal right heart chamber sizes and motion with Doppler evidence of moderate

pulmonary hypertension.

 

IVC size slightly dilated but currently adequate respiratory collapse against a

significantly elevated central venous pressure.

 

Aortic valvular sclerosis without stenosis and no more than trace insufficiency.

 

Normal aortic root size.

 

Moderately severe mitral annular calcification and slight leaflet edge 
thickening

but adequate cusp separation and no posterior systolic buckling, mild mitral

insufficiency.

 

Normal appearing tricuspid valve with mild insufficiency.

 

No apparent intracardiac mass or pericardial effusion.

 

Cardiac source of embolic material is serious suspect, would recommend complete

blood count, sedimentation rate, two sets of blood cultures  by 12 
hours

and a transesophageal echocardiogram.

[facility] [place]

## 2019-01-28 NOTE — NUR
Pt w/ moderate oral phase dysphagia. Recommend pureed solids and thin liquids. Please assist 
for PO intake & oral care 3x/day. Please assist pt for upright positioning for all intake.

-------------------------------------------------------------------------------

Addendum: 01/28/19 at 0945 by MARTIN MICHELLE [facility]

-------------------------------------------------------------------------------

Amended: Links added.

## 2019-01-28 NOTE — HPEPDOC
[facility] Medical History & Physical


Date of Admission


Jan 27, 2019


Other Provider


Dictating/admitting: Jaguar Doshi M.D.


Attending Physician:  NÉSTOR ESTRADA MD





History and Physical


CHIEF COMPLAINT: Left upper extremity weakness and pain 1 day





HISTORY OF PRESENT ILLNESS: Patient is an 83-year-old woman with hypertension, 

hyperlipidemia, diabetes, GERD, gout, history of Bell's palsy, right-sided fac

ial paralysis due to Bell's palsy and history of prior stroke. She reports being

in her usual state of health until sometime in the afternoon when she had pain 

in her left elbow of 6-7/10 in intensity, nonradiating, aggravated by movement 

with no relieving factor. She also describes inability to move her left upper 

extremity. Daughter also reports new unclear facial findings affecting her left 

eye as patient complains she is unable to focus laterally with her left eye. No 

mention of headaches, dizziness, syncope or near-syncope. No mention of nausea, 

vomiting, no associated chest pain, palpitations, shortness of breath. No cough.

No change in her bowel or urinary habits. Initial imaging done in the emergency 

room negative for acute cerebrovascular process. Medicine called in to admit 

patient.





PAST MEDICAL HISTORY:


Per HPI





PAST SURGICAL HISTORY:


1. Left elbow replacement.





SOCIAL HISTORY:


Denies smoking, alcohol or illicit drug use.


Ambulates with a walker. Daughter lives with patient in patient's home.





FAMILY HISTORY:


No significant ischemic heart disease in the family.





ALLERGIES: Please see below.





REVIEW OF SYSTEMS:


No dizziness, no headaches. No syncope nor near syncope. No palpitations, no 

chest pains, no shortness of breath. No cough. No change in bowel or urinary 

habits. Denies trauma to the left upper extremity. Other review of systems 

negative. 12 point review of system was done.





HOME MEDICATIONS: Please see below. 





PHYSICAL EXAMINATION:


VITAL SIGNS: Temperature 97.3, pulse 79, respiratory rate 18, blood pressure 

151/65, pulse oximetry 95% on room air.


GENERAL APPEARANCE: Elderly woman, lying calmly in bed, not in any apparent 

distress. she is not pale, anicteric and afebrile


HEENT: Atraumatic. Neck: Supple.


LUNGS: Clear to auscultation bilaterally.


CARDIOVASCULAR: S1 and 2 heard, no murmurs, rubs or gallops.


ABDOMEN: Obese, soft, not tender, not distended. Bowel sounds normoactive.


MUSCULOSKELETAL: Apparently within normal limits. 


EXTREMITIES: No pedal edema, 2+ bilateral pedal pulses noted. Left arm range of 

motion limited due to pain in the elbow.


NEUROLOGICAL: Awake, alert, oriented 3, she is dysarthric (unsure if this is 

new or old as patient has history of prior CVA). Unable to achieve lateral gaze 

of the left eye. Bell's palsy noted on the right side of face. Tongue deviates 

to the right. Left upper extremity range of motion limited due to pain in the 

elbow. The neurological findings above. Unsure if these are new or old symptoms.


PSYCHIATRIC: Normal affect 





LABORATORY DATA: See below.





IMAGING: 


CT brain without contrast: Age related changes. No acute findings. No mass/mass 

effect.


Chest x-ray: Chronic changes, stable. No obvious acute process.


Left elbow x-ray: Stable osteopenia and post surgical/degenerative/posttraumatic

changes, unchanged from previous done in 2018. No obvious fracture or 

dislocation.





EKG: Normal sinus rhythm. No acute ST or T-wave changes noted.





MICROBIOLOGY: Please see below. 





ASSESSMENT: An 83-year-old woman with above-mentioned comorbid history comes in 

with complaints of left elbow pain. Exam with unclear neurological findings 

consistent with TIA/stroke. Initial imaging is negative, I will admit and rule 

out TIA/stroke. Patient clearly out of TPA window considering the time 

presenting in the emergency room.





DIAGNOSIS:


TIA, rule out CVA.


.


PLAN:


1. We'll admit patient to PCU under care of Dr. Estrada.


2. IV normal saline to run at 50 mils per hour in anticipation of contrast 

studies.


3. Procedures: MRI, MRA, echocardiogram, carotid Dopplers.


4. Screen for risk factors, hemoglobin A1c for diabetes. I will add Plavix to 

medication regimen for this admission and continue aspirin.


5. Patient may resume by mouth as soon as she passes bedside swallow eval..


6. Hypertension: We'll allow permissive hypertension for the next 24-48 hours.


7. DuoNeb nebulization when necessary shortness of breath for COPD.


8. Hyperlipidemia. We'll continue statins.


9. GI prophylaxis. Pantoprazole.


10. DVT prophylaxis, TEDs.


11. Further management to be per patient's clinical course





Vital Signs





Vital Signs








  Date Time  Temp Pulse Resp B/P (MAP) Pulse Ox O2 Delivery O2 Flow Rate FiO2


 


1/27/19 22:23   20     


 


1/27/19 19:40 97.3 79  151/65 (93) 95 Room Air  











Laboratory Data


Labs 24H


Laboratory Tests 2


1/27/19 21:08: 


Urine Color YELLOW, Urine Appearance HAZY, Urine pH 5.0, Urine Specific Gravity 

1.024, Urine Protein 1+H, Urine Glucose (UA) 1+H, Urine Ketones NEGATIVE, Urine 

Blood NEGATIVE, Urine Nitrite NEGATIVE, Urine Bilirubin NEGATIVE, Urine 

Urobilinogen 2.0H, Urine Leukocyte Esterase NEGATIVE, Urine WBC (Auto) 5H, Urine

RBC (Auto) 6H, Urine Hyaline Casts (Auto) 0, Urine Bacteria (Auto) 1+H, Urine 

Squamous Epithelial Cells 1, Urine Mucus (Auto) SMALL, Urine Sperm (Auto) 


1/27/19 21:11: Bedside Glucose (Misc Panel) 179H


1/27/19 21:15: 


Immature Granulocyte % (Auto) 0.4, White Blood Count 11.1H, Red Blood Count 

4.20, Hemoglobin 13.1, Hematocrit 39.9, Mean Corpuscular Volume 95.0, Mean 

Corpuscular Hemoglobin 31.2, Mean Corpuscular Hemoglobin Concent 32.8, Red Cell 

Distribution Width 13.6, Platelet Count 145L, Neutrophils (%) (Auto) 72.6H, 

Lymphocytes (%) (Auto) 17.9L, Monocytes (%) (Auto) 8.1H, Eosinophils (%) (Auto) 

0.7, Basophils (%) (Auto) 0.3, Neutrophils # (Auto) 8.0H, Lymphocytes # (Auto) 

2.0, Monocytes # (Auto) 0.9H, Eosinophils # (Auto) 0.1, Basophils # (Auto) 0.0, 

Nucleated Red Blood Cells % (auto) 0.0


1/27/19 22:01: 


Anion Gap 7L, Glomerular Filtration Rate > 60.0, Blood Urea Nitrogen 24H, 

Creatinine 0.88, Sodium Level 138, Potassium Level 4.8, Chloride Level 105, 

Carbon Dioxide Level 26, Calcium Level 8.7L, Total Creatine Kinase 45, Creatine 

Kinase MB 2.0, Creatine Kinase MB Relative Index 3.56, Troponin I < 0.02


1/27/19 23:03: 


CBC/BMP


Laboratory Tests


1/27/19 21:15








Red Blood Count 4.20, Mean Corpuscular Volume 95.0, Mean Corpuscular Hemoglobin 

31.2, Mean Corpuscular Hemoglobin Concent 32.8, Red Cell Distribution Width 

13.6, Neutrophils (%) (Auto) 72.6 H, Lymphocytes (%) (Auto) 17.9 L, Monocytes 

(%) (Auto) 8.1 H, Eosinophils (%) (Auto) 0.7, Basophils (%) (Auto) 0.3, 

Neutrophils # (Auto) 8.0 H, Lymphocytes # (Auto) 2.0, Monocytes # (Auto) 0.9 H, 

Eosinophils # (Auto) 0.1, Basophils # (Auto) 0.0





1/27/19 22:01








Calcium Level 8.7 L, Total Creatine Kinase 45





Home Medications


Scheduled


 (Aspirin) 81 Mg Chw, 81 MG PO QHS


 (Preservision Areds 2) 1 Cap Cap, 1 CAP PO BID


 (Restasis Multidose) 0.05 % Emu, 1 DROP OU BID


Allopurinol (Zyloprim) 300 Mg Tab, 300 MG PO DAILY


Atorvastatin Calcium (Atorvastatin Calcium) 40 Mg Tab, 40 MG PO DAILY


Buprenorphine (Butrans) 20 Mcg/Hr Dis, 20 MCG TD 1XWK


   TAKES ON SATURDAYS, ALTERNATES SHOULDERS.  LAST APPLIED TO RIGHT SHOULDER 


Calcium/Vitamin D (Calcium 600+D 600-400 mg-Unit) 1 Tab Tab, 1 TAB PO DAILY


Carvedilol (Carvedilol) 12.5 Mg Tab, 12.5 MG PO BID


Glimepiride (Glimepiride) 2 Mg Tab, 2 MG PO DAILY


Insulin Glargine (Lantus) 1 Units/0.01 Ml Susp, 40 UNITS SC DAILY


Insulin Human Lispro (Humalog) 100 Unit/Ml Inj, 10 UNITS SC QPM


   AFTER SUPPER 


Linagliptin Base (Tradjenta) 5 Mg Tab, 5 MG PO DAILY


Losartan Potassium (Losartan Potassium) 50 Mg Tab, 50 MG PO DAILY


Omeprazole (Omeprazole) 20 Mg Cap, 20 MG PO BID





Scheduled PRN


Acetaminophen (Acetaminophen Extra Stren) 500 Mg Tab, 500 MG PO Q4H PRN for PAIN


Artificial Tears (Artificial Tears) 1.4 % Sol, 1 DROP OU QID PRN for DRY EYES





Allergies


Coded Allergies:  


     Duloxetine (Verified  Allergy, Unknown, 4/1/13)


     Penicillins (Verified  Adverse Reaction, Unknown, YEAST INFECTION, 4/1/13)


     Penicillins Cross Reactors (Verified  Adverse Reaction, Unknown, YEAST 

INFECTION, 4/1/13)











JAGUAR DOSHI MD            Jan 27, 2019 23:21

## 2019-01-28 NOTE — REP
Procedure:

Midline catheter insertion with Kirsten-Rite

 

The procedure was performed under the direct supervision of Dr. Rabago.

 

The risks and benefits of the procedure were explained to the patient and

informed consent was obtained.

 

The right basilic vein was localized using ultrasound guidance.  The skin was

prepped and draped in a sterile fashion.  1% lidocaine was used as a local

anesthetic.  Using ultrasound guidance the basilic vein was cannulated and a

0.018 guidewire was inserted. The needle was removed and a 5.5 Lao dilator and

peel-away sheath was inserted over the guide wire.  A 5.5 Lao dual lumen

catheter was left at a length of 16.5 cm.  The dilator was removed and the

catheter was inserted over the guide wire.  The peel-away sheath was removed and

the catheter was flushed with heparinized saline as per Hospital protocol.  The

catheter was affixed to the skin and a sterile dressing was applied.

 

 

The patient tolerated the procedure well and there were no immediate

complications.

 

 

Reviewed by

NICK Garay 01/28/2019 03:59 P

Electronically Signed by

Matthew Rabago MD 01/28/2019 04:33 P

## 2019-01-29 VITALS — SYSTOLIC BLOOD PRESSURE: 194 MMHG | DIASTOLIC BLOOD PRESSURE: 78 MMHG

## 2019-01-29 VITALS — DIASTOLIC BLOOD PRESSURE: 76 MMHG | SYSTOLIC BLOOD PRESSURE: 166 MMHG

## 2019-01-29 VITALS — SYSTOLIC BLOOD PRESSURE: 188 MMHG | DIASTOLIC BLOOD PRESSURE: 81 MMHG

## 2019-01-29 VITALS — SYSTOLIC BLOOD PRESSURE: 152 MMHG | DIASTOLIC BLOOD PRESSURE: 68 MMHG

## 2019-01-29 VITALS — SYSTOLIC BLOOD PRESSURE: 192 MMHG | DIASTOLIC BLOOD PRESSURE: 79 MMHG

## 2019-01-29 VITALS — SYSTOLIC BLOOD PRESSURE: 147 MMHG | DIASTOLIC BLOOD PRESSURE: 68 MMHG

## 2019-01-29 LAB
BUN SERPL-MCNC: 21 MG/DL (ref 7–18)
CALCIUM SERPL-MCNC: 8.1 MG/DL (ref 8.8–10.2)
CHLORIDE SERPL-SCNC: 106 MEQ/L (ref 98–107)
CO2 SERPL-SCNC: 24 MEQ/L (ref 21–32)
CREAT SERPL-MCNC: 0.73 MG/DL (ref 0.55–1.3)
GFR SERPL CREATININE-BSD FRML MDRD: > 60 ML/MIN/{1.73_M2} (ref 32–?)
GLUCOSE SERPL-MCNC: 159 MG/DL (ref 70–100)
HCT VFR BLD AUTO: 35.1 % (ref 36–47)
HGB BLD-MCNC: 11.5 G/DL (ref 12–15.5)
MCH RBC QN AUTO: 30.8 PG (ref 27–33)
MCHC RBC AUTO-ENTMCNC: 32.8 G/DL (ref 32–36.5)
MCV RBC AUTO: 94.1 FL (ref 80–96)
PLATELET # BLD AUTO: 98 10^3/UL (ref 150–450)
POTASSIUM SERPL-SCNC: 4.2 MEQ/L (ref 3.5–5.1)
RBC # BLD AUTO: 3.73 10^6/UL (ref 4–5.4)
SODIUM SERPL-SCNC: 138 MEQ/L (ref 136–145)
WBC # BLD AUTO: 9 10^3/UL (ref 4–10)

## 2019-01-29 RX ADMIN — ASPIRIN SCH MG: 81 TABLET ORAL at 08:53

## 2019-01-29 RX ADMIN — SODIUM CHLORIDE SCH UNITS: 4.5 INJECTION, SOLUTION INTRAVENOUS at 08:53

## 2019-01-29 RX ADMIN — NYSTATIN SCH DOSE: 100000 POWDER TOPICAL at 20:25

## 2019-01-29 RX ADMIN — ONDANSETRON PRN MG: 2 INJECTION INTRAMUSCULAR; INTRAVENOUS at 22:23

## 2019-01-29 RX ADMIN — INSULIN LISPRO SCH UNITS: 100 INJECTION, SOLUTION INTRAVENOUS; SUBCUTANEOUS at 08:53

## 2019-01-29 RX ADMIN — INSULIN LISPRO SCH UNITS: 100 INJECTION, SOLUTION INTRAVENOUS; SUBCUTANEOUS at 20:22

## 2019-01-29 RX ADMIN — Medication SCH UNITS: at 17:45

## 2019-01-29 RX ADMIN — Medication SCH UNITS: at 05:27

## 2019-01-29 RX ADMIN — SODIUM CHLORIDE SCH ML: 9 INJECTION, SOLUTION INTRAMUSCULAR; INTRAVENOUS; SUBCUTANEOUS at 17:45

## 2019-01-29 RX ADMIN — PANTOPRAZOLE SODIUM SCH MG: 40 TABLET, DELAYED RELEASE ORAL at 08:53

## 2019-01-29 RX ADMIN — INSULIN LISPRO SCH UNITS: 100 INJECTION, SOLUTION INTRAVENOUS; SUBCUTANEOUS at 12:41

## 2019-01-29 RX ADMIN — INSULIN LISPRO SCH UNITS: 100 INJECTION, SOLUTION INTRAVENOUS; SUBCUTANEOUS at 17:46

## 2019-01-29 RX ADMIN — CLOPIDOGREL BISULFATE SCH MG: 75 TABLET ORAL at 08:53

## 2019-01-29 RX ADMIN — SODIUM CHLORIDE SCH MLS/HR: 9 INJECTION, SOLUTION INTRAVENOUS at 17:54

## 2019-01-29 RX ADMIN — MORPHINE SULFATE PRN MG: 4 INJECTION INTRAVENOUS at 05:27

## 2019-01-29 RX ADMIN — ALLOPURINOL SCH MG: 300 TABLET ORAL at 08:53

## 2019-01-29 RX ADMIN — SODIUM CHLORIDE SCH ML: 9 INJECTION, SOLUTION INTRAMUSCULAR; INTRAVENOUS; SUBCUTANEOUS at 05:27

## 2019-01-29 RX ADMIN — MORPHINE SULFATE PRN MG: 4 INJECTION INTRAVENOUS at 01:25

## 2019-01-29 RX ADMIN — MORPHINE SULFATE PRN MG: 4 INJECTION INTRAVENOUS at 23:31

## 2019-01-29 RX ADMIN — ATORVASTATIN CALCIUM SCH MG: 20 TABLET, FILM COATED ORAL at 08:53

## 2019-01-29 RX ADMIN — SODIUM CHLORIDE SCH UNITS: 4.5 INJECTION, SOLUTION INTRAVENOUS at 20:24

## 2019-01-29 RX ADMIN — SODIUM CHLORIDE SCH MLS/HR: 9 INJECTION, SOLUTION INTRAVENOUS at 15:53

## 2019-01-29 RX ADMIN — NYSTATIN SCH DOSE: 100000 POWDER TOPICAL at 08:54

## 2019-01-29 NOTE — REP
CT angiography of the brain with IV contrast:

 

History:  CVA.

 

CT contrast dose:  100 ml as of intravenous Isovue 370 is administered.

 

Comparison CT study January 27, 2019.  Comparison brain MR angiography was

uninterpretable due to motion artifact.

 

CT angiographic findings:  There is fairly heavy calcific plaquing with 50-60%

narrowing of the distal vertebral artery on the right.  The right vertebral

artery is dominant.  The left vertebral artery is quite tiny and it shows

calcification as well.  Basilar artery is relatively small and shows mild

atherosclerotic irregularity.  No high-grade stenosis.  Persistent fetal origin

of the left posterior cerebral artery.  Right posterior cerebral artery shows an

abrupt termination consistent with an arterial occlusion.

 

Heavy vascular calcification is seen in the carotid siphons bilaterally with of

50-60% narrowing.  Anterior and middle cerebral arteries appear intact.  No

evidence of berry aneurysm or arteriovenous malformation.

 

Impression:

 

1.  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

 

2.  Heavy vascular calcification of the right-dominant distal vertebral artery.

 

3.  Diminutive left vertebral artery and fairly small basilar artery.

 

4.  Persistent fetal origin left posterior cerebral artery.

 

5.  Moderate atherosclerotic plaquing 50-60% narrowing of the distal internal

carotid arteries bilaterally.

 

 

Electronically Signed by

Ryan Charles MD 01/30/2019 08:02 A

## 2019-01-29 NOTE — REP
CT angiography of the neck with IV contrast:

 

History:  CVA.

 

CT contrast dose:  100 ml of intravenous Isovue 370.

 

CT findings:  Vascular calcification is seen at the origin of the left subclavian

artery in the posterior arch.  The arch itself is incompletely visualized.  The

innominate is ectatic and tortuous with some calcification.  There is calcific

plaquing at the origin of the right vertebral artery with significant stenosis at

the origin of the right vertebral artery.  The right vertebral artery is

dominant.  The left vertebral artery is tiny distally and the proximal left

vertebral artery is not seen.  There is vascular calcification in the distal

right vertebral artery just prior to its junction with the tiny left.

 

Vascular calcification is seen at the origin of the internal carotid arteries

from the bulb bilaterally with 50% narrowing on the left and 75-80% narrowing on

the right due to calcific plaquing.  There is moderate calcification in the

carotid siphons bilaterally consistent with atherosclerosis.

 

Impression:

 

1.  Dominant right vertebral artery shows high-grade stenosis at its origin.

There is moderate calcification of the distal vertebral artery on the right as

well. The left vertebral artery proximally is not seen.  Distally the left

vertebral artery is quite tiny.

 

2.  There is moderate calcific plaquing at the origin of the internal carotid

arteries bilaterally with a 75-80% stenosis of the right ICA and 50% stenosis of

the left.

 

 

Electronically Signed by

Ryan Charles MD 01/29/2019 03:39 P

## 2019-01-29 NOTE — IPNPDOC
Date Seen


The patient was seen on 1/29/19.





Progress Note


SUBJECTIVE: Patient was seen and examined this morning. She states she is 

relatively well. When asked if she has any complaints this morning she states no

she understands that she had a stroke and she will get further imaging later in 

the afternoon to assess how significant her stenosis is. Echocardiogram was done

yesterday which resulted in recommendations for a PREETHI was made. Dr. Karly starkey

s called to see if the PREETHI can be performed the patient has remained in sinus 

rhythm because she is over the age of 66 the possibility that she has a PFO or 

such as low he recommends that we finish current neurological workup inpatient 

and he will see her outpatient for cardiac workup for stroke. Patient denies any

shortness of breath chest pain nausea vomiting diarrhea abdominal pain. She does

continue to have the pain in her left arm which is chronic in nature.








OBJECTIVE


PHYSICAL EXAMINATION:


VITAL SIGNS: Please see below. 


GENERAL: Elderly woman, lying calmly in bed, not in any apparent distress. she 

is not pale, anicteric and afebrile


HEENT: Atraumatic. Neck: Supple.


LUNGS: Clear to auscultation bilaterally.


CARDIOVASCULAR: S1 and 2 heard, no murmurs, rubs or gallops.


ABDOMEN: Obese, soft, nontender bowel sounds. Hypoactive bowel sounds


MUSCULOSKELETAL: Apparently within normal limits. 


EXTREMITIES: No pedal edema, 2+ bilateral pedal pulses noted. Left arm range of 

motion limited due to pain in the elbow.


NEUROLOGICAL: Awake, alert she is dysarthric  Bell's palsy noted on the right 

side of face. Right lateral tongue deviation. Left upper extremity range of 

motion limited due to pain in the elbow. left-sided homonymous hemianopsia.  








LABORATORY DATA, IMAGING STUDIES, MICROBIOLOGY: Please see below.





CT angiogram of the neck with IV contrast


1.  Dominant right vertebral artery shows high-grade stenosis at its origin. 

There is moderate calcification of the distal vertebral artery on the right as 

well. The left vertebral artery proximally is not seen.  Distally the left 

vertebral artery is quite tiny.


 


2.  There is moderate calcific plaquing at the origin of the internal carotid 

arteries bilaterally with a 75-80% stenosis of the right ICA and 50% stenosis of

the left.


 


Echocardiogram


Dr. Welch 01/28/2019


Normal sinus rhythm without intraventricular conduction disturbance..


IVC size slightly dilated but currently adequate respiratory collapse against a 

significantly elevated central venous pressure.


Cardiac source of embolic material is serious suspect, would recommend complete 

blood count, sedimentation rate, two sets of blood cultures  by 12 

hours and a transesophageal echocardiogram.





DVT prophylaxis ordered?: Yes TEDs and Sequential





ASSESSMENT AND PLAN: This is a 83-year-old woman who presented with Left upper 

extremity weakness and pain 1 day





PROBLEMS:





TIA, rule out CVA.


-history of prior stroke?, Daughter is unsure


-Neurologic checks every 4 hours


-CT head - negative. 


-Neurology consulted


-Continue with ASA and plavix 75 mg.


-CT angiogram of the neck positive for moderate stenosis 75-80% stenosis of the 

right ICA and 50% stenosis of the left. 


-Will consult Bubba to see if the patient is a candidate for CEA


-c/w Lipitor 40mg


-Will check lipid profile, TSH and A1c in the a.m.


-Echocardiogram completed recommendations above. Dr. Jacome called to see if 

the PREETHI can be performed while the patient was admitted. he states to complete 

the current neurological workup and once the patient is stable to refer her 

outpatient to him for "a cardiac workup for stroke" and from there he will 

decide if she needs a PREETHI or loop recorder.





Hypertension


-permissive hypertension (140-180 ) for 24 more hours continue 75mls cc/hr for 

now. 


-will monitor





Left Elbow pain


-Has a history of left elbow replacements status post left elbow fracture


-imaging negative for fractures or DVT


-[id number]


-Percocet and morphine when necessary for pain management








Left lower quadrant pain 


-Physical exam positive for left quadrant pain and nausea


-CT after a negative for abdominopelvic pathology








Hyperlipidemia


-c/w Lipitor 40mg





Diabetes


-c/w ISS





GERD


-c/w protonix





Gout


-c/w Allopurinol 





Right-sided facial paralysis 2/2 to history of Bell's palsy





Rehabilitation


-PT and OT ordered





VS, I&O, 24H, Fishbone


Vital Signs/I&O





Vital Signs








  Date Time  Temp Pulse Resp B/P (MAP) Pulse Ox O2 Delivery O2 Flow Rate FiO2


 


1/29/19 15:55   18     


 


1/29/19 12:20 98.9 83  147/68 (94) 94 Room Air  














I&O- Last 24 Hours up to 6 AM 


 


 1/29/19





 06:00


 


Intake Total 440 ml


 


Output Total 430 ml


 


Balance 10 ml











Laboratory Data


24H LABS


Laboratory Tests 2


1/28/19 16:47: Bedside Glucose (Misc Panel) 197H


1/28/19 20:05: Bedside Glucose (Misc Panel) 174H


1/29/19 04:44: 


Nucleated Red Blood Cells % (auto) 0.0, Immature Platelet Fraction 9.3, Anion 

Gap 8, Glomerular Filtration Rate > 60.0, Blood Urea Nitrogen 21H, Creatinine 

0.73, Sodium Level 138, Potassium Level 4.2, Chloride Level 106, Carbon Dioxide 

Level 24, Calcium Level 8.1L


1/29/19 11:53: Bedside Glucose (Misc Panel) 169H


CBC/BMP


Laboratory Tests


1/29/19 04:44








Red Blood Count 3.73 L, Mean Corpuscular Volume 94.1, Mean Corpuscular 

Hemoglobin 30.8, Mean Corpuscular Hemoglobin Concent 32.8, Red Cell Distribution

Width 13.8, Calcium Level 8.1 L


Microbiology





Microbiology


1/28/19 Blood Culture - Preliminary, Resulted


          No growth after 24 hours . All specim...


1/28/19 Blood Culture - Preliminary, Resulted


          No growth after 24 hours . All specim...


1/28/19 Urine Culture, Received


          Pending





GME ATTESTATION


GME ATTESTATION


My faculty preceptor for this patient encounter was physically present during 

the encounter and was fully available. All aspects of the patient interview, 

examination, medical decision making process, and medical care plan development 

were reviewed and approved by the faculty preceptor. The faculty preceptor is 

aware and concurs with the plan as stated in the body of this note and will atte

st to such by his/her cosignature.





ATTENDING NOTE


I have both independently examined this patient as well as reviewed the note  I 

have discussed in detail the findings and plan of treatment as documented in the

note. I will continue to follow the patient and offer further guidance to the 

patients care as necessary during this hospital stay.





DAO Cordova MD, DO       Jan 29, 2019 16:39


LUCIA JAIMES MD                      Jan 30, 2019 12:52

## 2019-01-29 NOTE — ECGEPIP
Stationary ECG Study

                           [facility] - ED

                                       

                                       Test Date:    2019

Pat Name:     SREE EDWARDS             Department:   

Patient ID:   [id number]                 Room:         [place] [address]

Gender:       F                        Technician:   AK

:          [id number]               Requested By: IRLANDA SCHROEDER 

Order Number: [id number]     Reading MD:   Omer Solomon

                                 Measurements

Intervals                              Axis          

Rate:         79                       P:            62

NM:           157                      QRS:          -4

QRSD:         100                      T:            -15

QT:           345                                    

QTc:          396                                    

                           Interpretive Statements

SINUS RHYTHM

VOLTAGE CRITERIA FOR LVH

NONSPECIFIC T-WAVE ABNORMALITY

SIMILAR TO 18

 

Electronically Signed On 2019 20:45:59 EST by Omer Solomon

## 2019-01-29 NOTE — CR
DATE OF CONSULTATION:  01/29/2019

 

REASON FOR CONSULTATION:  Left arm pain and vision loss.

 

The patient is an 83-year-old female with longstanding history of chronic left

elbow pain status post elbow replacement surgery.  The patient mentioned that she

has difficulty seeing since yesterday afternoon.  The patient is noted on MRI

imaging that she has a fairly large right occipital PCA territorial right

thalamic ischemic infarction.  The patient was on aspirin 81 mg at home.  She has

been placed on aspirin and Plavix.  Carotid ultrasound reveals bilateral 50-69%

narrowing.  The patient's MR angiogram could not be interpreted due to motion.

The patient is on Lipitor 40 mg daily.  Her blood pressure is a little bit on the

softer side right now with systolic of 125.  The patient will need complete

workup including the final report of the echocardiogram.  The patient is

currently on fluids 60 mL an hour.  This may need to be bumped up depending on

the patient's cardiac output.  The patient at the present time complains mostly

of left arm pain.  She does not have good vision at baseline, but states that she

cannot see objects in any of the left visual fields.

 

REVIEW OF SYSTEMS:

14-point review of systems obtained and is negative except as per HPI.

 

PAST MEDICAL HISTORY:

Hypertension.

Hyperlipidemia.

Diabetes.

Gastroesophageal reflux disease (GERD).

Gout.

Bell's palsy of the right face.

Chronic left elbow pain.

 

PAST SURGICAL HISTORY:

Left elbow replacement.

 

SOCIAL HISTORY:

The patient denies use of any tobacco, alcohol or illicit drugs.

 

FAMILY HISTORY:  Noncontributory.

 

ALLERGIES:

1.  DULOXETINE.

2.  PENICILLIN.

3.  PENICILLIN CROSS REACTORS.

 

PHYSICAL EXAMINATION:

Blood pressure is 141/67, pulse rate 85, respiratory rate is 20, temperature 99.3

degrees Fahrenheit, oxygenation 98% on room air.

The patient is alert, oriented to person, place, time.  Speech is without aphasia

or dysarthria.  Pupils are 2.5 mm, reactive to light.  The patient has visual

field deficit with left-sided homonymous hemianopsia.  Tongue is midline.

Sensation in V1, V2, and V3 is intact to light touch.  No facial asymmetry to

activation.  The patient cannot lift her left arm due to chronic pain.  She has

reasonable strength and bilateral hand  and in the right deltoid, biceps,

triceps, the patient has reasonable strength in bilateral anterior tibialis,

quadriceps.  Sensory is intact to light touch in all four extremities.  Deep

tendon reflexes are reduced distally.  Positive Babinski sign of left foot.

Coordination of the right hand does not reveal any gross ataxia.  The patient

could not move the left arm at all due to chronic pain.

 

ASSESSMENT:

1.  Right PCA territorial acute infarction with left-sided homonymous

hemianopsia.

2.  Chronic left elbow pain followed by the pain clinic and Dr. Gan.

 

PLAN

1.  Agree with Plavix 75 mg by mouth daily plus aspirin 81 mg by mouth daily,

Lipitor 40 mg by mouth daily.

2.  Follow-up results of echocardiogram.  Continue telemetry monitoring.

3.  Recommend physical therapy/occupational therapy (PT/OT) evaluations.

4.  Recommend fasting lipid profile, TSH, hemoglobin A1c.  Optimize diabetes and

hyperlipidemia.

5.  Systolic blood pressures can remain 140-180 for the next 48 hours and then

can become normal.

6.  Recommend CT angio of the head and neck to better approximate the carotid

stenosis and also to have a more confirmed reading of intracranial vasculature.

 

 

History obtained both the patient and the patient's daughter.

## 2019-01-30 VITALS — DIASTOLIC BLOOD PRESSURE: 62 MMHG | SYSTOLIC BLOOD PRESSURE: 136 MMHG

## 2019-01-30 VITALS — SYSTOLIC BLOOD PRESSURE: 149 MMHG | DIASTOLIC BLOOD PRESSURE: 67 MMHG

## 2019-01-30 VITALS — SYSTOLIC BLOOD PRESSURE: 144 MMHG | DIASTOLIC BLOOD PRESSURE: 106 MMHG

## 2019-01-30 VITALS — SYSTOLIC BLOOD PRESSURE: 132 MMHG | DIASTOLIC BLOOD PRESSURE: 62 MMHG

## 2019-01-30 VITALS — DIASTOLIC BLOOD PRESSURE: 65 MMHG | SYSTOLIC BLOOD PRESSURE: 146 MMHG

## 2019-01-30 VITALS — SYSTOLIC BLOOD PRESSURE: 148 MMHG | DIASTOLIC BLOOD PRESSURE: 66 MMHG

## 2019-01-30 VITALS — DIASTOLIC BLOOD PRESSURE: 74 MMHG | SYSTOLIC BLOOD PRESSURE: 177 MMHG

## 2019-01-30 LAB
BUN SERPL-MCNC: 14 MG/DL (ref 7–18)
CALCIUM SERPL-MCNC: 7.8 MG/DL (ref 8.8–10.2)
CHLORIDE SERPL-SCNC: 103 MEQ/L (ref 98–107)
CHOLEST SERPL-MCNC: 119 MG/DL (ref ?–200)
CHOLEST/HDLC SERPL: 3.61 {RATIO} (ref ?–5)
CO2 SERPL-SCNC: 25 MEQ/L (ref 21–32)
CREAT SERPL-MCNC: 0.62 MG/DL (ref 0.55–1.3)
ERYTHROCYTE [SEDIMENTATION RATE] IN BLOOD BY WESTERGREN METHOD: 30 MM/HR (ref 0–30)
EST. AVERAGE GLUCOSE BLD GHB EST-MCNC: 226 MG/DL (ref 60–110)
GFR SERPL CREATININE-BSD FRML MDRD: > 60 ML/MIN/{1.73_M2} (ref 32–?)
GLUCOSE SERPL-MCNC: 168 MG/DL (ref 70–100)
HCT VFR BLD AUTO: 36.4 % (ref 36–47)
HDLC SERPL-MCNC: 33 MG/DL (ref 40–?)
HGB BLD-MCNC: 12 G/DL (ref 12–15.5)
LDLC SERPL CALC-MCNC: 61 MG/DL (ref ?–100)
MAGNESIUM SERPL-MCNC: 1.6 MG/DL (ref 1.8–2.4)
MCH RBC QN AUTO: 30.8 PG (ref 27–33)
MCHC RBC AUTO-ENTMCNC: 33 G/DL (ref 32–36.5)
MCV RBC AUTO: 93.3 FL (ref 80–96)
NONHDLC SERPL-MCNC: 86 MG/DL
PLATELET # BLD AUTO: 114 10^3/UL (ref 150–450)
POTASSIUM SERPL-SCNC: 4.1 MEQ/L (ref 3.5–5.1)
RBC # BLD AUTO: 3.9 10^6/UL (ref 4–5.4)
SODIUM SERPL-SCNC: 137 MEQ/L (ref 136–145)
TRIGL SERPL-MCNC: 127 MG/DL (ref ?–150)
TSH SERPL DL<=0.005 MIU/L-ACNC: 1.94 UIU/ML (ref 0.36–3.74)
WBC # BLD AUTO: 10.3 10^3/UL (ref 4–10)

## 2019-01-30 RX ADMIN — ONDANSETRON PRN MG: 2 INJECTION INTRAMUSCULAR; INTRAVENOUS at 07:53

## 2019-01-30 RX ADMIN — ONDANSETRON PRN MG: 2 INJECTION INTRAMUSCULAR; INTRAVENOUS at 02:31

## 2019-01-30 RX ADMIN — ONDANSETRON PRN MG: 2 INJECTION INTRAMUSCULAR; INTRAVENOUS at 19:33

## 2019-01-30 RX ADMIN — POLYETHYLENE GLYCOL 3350 SCH PKT: 17 POWDER, FOR SOLUTION ORAL at 12:09

## 2019-01-30 RX ADMIN — ASPIRIN SCH MG: 81 TABLET ORAL at 10:11

## 2019-01-30 RX ADMIN — Medication SCH UNITS: at 17:21

## 2019-01-30 RX ADMIN — Medication SCH EA: at 09:00

## 2019-01-30 RX ADMIN — CLOPIDOGREL BISULFATE SCH MG: 75 TABLET ORAL at 10:11

## 2019-01-30 RX ADMIN — ATORVASTATIN CALCIUM SCH MG: 20 TABLET, FILM COATED ORAL at 10:11

## 2019-01-30 RX ADMIN — SODIUM CHLORIDE SCH ML: 9 INJECTION, SOLUTION INTRAMUSCULAR; INTRAVENOUS; SUBCUTANEOUS at 06:00

## 2019-01-30 RX ADMIN — ALLOPURINOL SCH MG: 300 TABLET ORAL at 10:11

## 2019-01-30 RX ADMIN — PANTOPRAZOLE SODIUM SCH MG: 40 TABLET, DELAYED RELEASE ORAL at 10:11

## 2019-01-30 RX ADMIN — INSULIN LISPRO SCH UNITS: 100 INJECTION, SOLUTION INTRAVENOUS; SUBCUTANEOUS at 16:43

## 2019-01-30 RX ADMIN — NYSTATIN SCH DOSE: 100000 POWDER TOPICAL at 20:58

## 2019-01-30 RX ADMIN — SODIUM CHLORIDE SCH UNITS: 4.5 INJECTION, SOLUTION INTRAVENOUS at 08:32

## 2019-01-30 RX ADMIN — DOCUSATE SODIUM,SENNOSIDES SCH TAB: 50; 8.6 TABLET, FILM COATED ORAL at 20:56

## 2019-01-30 RX ADMIN — NYSTATIN SCH DOSE: 100000 POWDER TOPICAL at 10:10

## 2019-01-30 RX ADMIN — MORPHINE SULFATE PRN MG: 4 INJECTION INTRAVENOUS at 08:18

## 2019-01-30 RX ADMIN — SODIUM CHLORIDE SCH UNITS: 4.5 INJECTION, SOLUTION INTRAVENOUS at 20:58

## 2019-01-30 RX ADMIN — INSULIN LISPRO SCH UNITS: 100 INJECTION, SOLUTION INTRAVENOUS; SUBCUTANEOUS at 20:51

## 2019-01-30 RX ADMIN — MINERAL OIL SCH EA: 100 ENEMA RECTAL at 10:28

## 2019-01-30 RX ADMIN — SODIUM CHLORIDE SCH ML: 9 INJECTION, SOLUTION INTRAMUSCULAR; INTRAVENOUS; SUBCUTANEOUS at 17:21

## 2019-01-30 RX ADMIN — Medication PRN UNITS: at 08:33

## 2019-01-30 RX ADMIN — MORPHINE SULFATE PRN MG: 4 INJECTION INTRAVENOUS at 02:32

## 2019-01-30 RX ADMIN — DOCUSATE SODIUM,SENNOSIDES SCH TAB: 50; 8.6 TABLET, FILM COATED ORAL at 10:27

## 2019-01-30 RX ADMIN — Medication SCH UNITS: at 06:00

## 2019-01-30 RX ADMIN — INSULIN LISPRO SCH UNITS: 100 INJECTION, SOLUTION INTRAVENOUS; SUBCUTANEOUS at 08:32

## 2019-01-30 RX ADMIN — MORPHINE SULFATE PRN MG: 4 INJECTION INTRAVENOUS at 19:33

## 2019-01-30 RX ADMIN — SODIUM CHLORIDE PRN ML: 9 INJECTION, SOLUTION INTRAMUSCULAR; INTRAVENOUS; SUBCUTANEOUS at 08:33

## 2019-01-30 RX ADMIN — INSULIN LISPRO SCH UNITS: 100 INJECTION, SOLUTION INTRAVENOUS; SUBCUTANEOUS at 12:09

## 2019-01-30 NOTE — IPNPDOC
Date Seen


The patient was seen on 1/30/19.





Progress Note


SUBJECTIVE: Patient was seen and examined this morning. Was reported the patient

was very nauseous overnight and this morning. She was given Zofran 3 times since

the evening. When palpating her abdomen she does complain of pain which is 

diffuse. It was reported she has not had a bowel movement since being admitted. 

CT of the abdomen the 28th did show that she has significant amount of stool in 

her bowels. Bowel care enema has been ordered. CT angiogram of the head and neck

that showed that she has significant stenosis of her right carotid artery and 

some stenosis in her left carotid artery. Dr. Mac will see the patient later 

this afternoon but he does believe based on reviewing her chart that she is a 

candidate for an endarterectomy. She continues to also have pain in her left low

we will call her daughter later this afternoon to bring her home pain patch so 

she may use it here to help for pain control








OBJECTIVE


PHYSICAL EXAMINATION:


VITAL SIGNS: Please see below. 


GENERAL: Elderly woman, lying calmly in bed, not in any apparent distress. she 

is not pale, anicteric and afebrile


HEENT: Atraumatic. Neck: Supple.


LUNGS: Clear to auscultation bilaterally.


CARDIOVASCULAR: S1 and 2 heard, no murmurs, rubs or gallops.


ABDOMEN: Obese, diffuse tender. Hard stool palpated on the left quadrants. 

Hypoactive bowel sounds


MUSCULOSKELETAL: Apparently within normal limits. 


EXTREMITIES: No pedal edema, 2+ bilateral pedal pulses noted. Left arm range of 

motion limited due to pain in the elbow.


NEUROLOGICAL: Awake, alert she is dysarthric  Bell's palsy noted on the right 

side of face. Right lateral tongue deviation. Left upper extremity range of 

motion limited due to pain in the elbow. left-sided homonymous hemianopsia.  








LABORATORY DATA, IMAGING STUDIES, MICROBIOLOGY: Please see below.





CT angiogram of the neck with IV contrast


1.  Dominant right vertebral artery shows high-grade stenosis at its origin. Th

ere is moderate calcification of the distal vertebral artery on the right as 

well. The left vertebral artery proximally is not seen.  Distally the left 

vertebral artery is quite tiny.


 


2.  There is moderate calcific plaquing at the origin of the internal carotid 

arteries bilaterally with a 75-80% stenosis of the right ICA and 50% stenosis of

the left.


 


Echocardiogram


Dr. Welch 01/28/2019


Normal sinus rhythm without intraventricular conduction disturbance..


IVC size slightly dilated but currently adequate respiratory collapse against a 

significantly elevated central venous pressure.


Cardiac source of embolic material is serious suspect, would recommend complete 

blood count, sedimentation rate, two sets of blood cultures  by 12 

hours and a transesophageal echocardiogram.





DVT prophylaxis ordered?: Yes TEDs and Sequential





ASSESSMENT AND PLAN: This is a 83-year-old woman who presented with Left upper 

extremity weakness and pain 1 day





PROBLEMS:








CVA - right posterior cerebral artery 


-Neurologic checks every 4 hours


-MRI: right posterior cerebral artery


-Neurology consulted


-Continue with ASA and plavix 75 mg.


-CT angiogram of the neck positive for moderate stenosis 75-80% stenosis of the 

right ICA and 50% stenosis of the left. 


-Consult Bubba for the significant stenosis - CEA?


-c/w Lipitor 40mg


-Lipid profile appropriate, A1c 9.5 currently on diabetic medication, TSH 1.94 

wNl


-Echocardiogram completed recommendations above. Dr. Jacome called to see if 

the PREETHI can be performed while the patient was admitted. he states to complete 

the current neurological workup and once the patient is stable to refer her 

outpatient to him for "a cardiac workup for stroke" and from there he will 

decide if she needs a PREETHI or loop recorder.





-Dr. Jaimes discussed with the patient about an endarterectomy patient states that 

she does not want to have surgeries even though there are risk for more strokes.

He discussed the risk and benefits of the procedure and she is adamant being 

DNR/DNI with no surgery. This is discussed with her daughter as well who 

concurred with the patient's wishes. She was still being evaluated by Dr. Mac 

later this evening. 








Nausea/constipation


-CT abdomen reviewed by me can see stool throughout the colon


-No bowel movement since admission


-Continue with Zofran 4 mg every 4 hours when necessary for nausea


-Start MiraLAX, senna S and mineral oil enema to help her constipation





Hypertension


-stopped NS fluids.  


-will monitor





Left Elbow pain / Chronic


-Has a history of left elbow replacements status post left elbow fracture


-imaging negative for fractures or DVT


-sumlimazex1


-Percocet and morphine when necessary for pain management


-Will ask daughter to bring home patches. 





Hyperlipidemia


-c/w Lipitor 40mg





Diabetes


-c/w ISS





GERD


-c/w protonix





Gout


-c/w Allopurinol 





Right-sided facial paralysis 2/2 to history of Bell's palsy





Rehabilitation


-PT and OT ordered





CODE STATUS DNR/DNI


-Updated MOLST in the chart





VS, I&O, 24H, Fishbone


Vital Signs/I&O





Vital Signs








  Date Time  Temp Pulse Resp B/P (MAP) Pulse Ox O2 Delivery O2 Flow Rate FiO2


 


1/30/19 04:00 100.3 97 18 177/74 (108) 95 Room Air  














I&O- Last 24 Hours up to 6 AM 


 


 1/30/19





 06:00


 


Intake Total 1710 ml


 


Output Total 1275 ml


 


Balance 435 ml











Laboratory Data


24H LABS


Laboratory Tests 2


1/29/19 11:53: Bedside Glucose (Misc Panel) 169H


1/29/19 17:08: Bedside Glucose (Misc Panel) 194H


1/29/19 20:21: Bedside Glucose (Misc Panel) 175H


1/30/19 04:33: 


Nucleated Red Blood Cells % (auto) 0.0, Erythrocyte Sedimentation Rate 30, Anion

Gap 9, Glomerular Filtration Rate > 60.0, Estimated Mean Plasma Glucose 226H, 

Hemoglobin A1c 9.5, Calcium Level 7.8L, Magnesium Level 1.6L, Triglycerides 

Level 127, LDL Cholesterol 61, Total Cholesterol 119, Non-HDL Cholesterol (LDL +

VLDL) 86, Total HDL Cholesterol 33L, Cholesterol/HDL Ratio 3.606, Thyroid 

Stimulating Hormone (TSH) 1.940


CBC/BMP


Laboratory Tests


1/30/19 04:33








Red Blood Count 3.90 L, Mean Corpuscular Volume 93.3, Mean Corpuscular 

Hemoglobin 30.8, Mean Corpuscular Hemoglobin Concent 33.0, Red Cell Distribution

Width 13.4


Microbiology





Microbiology


1/28/19 Blood Culture - Preliminary, Resulted


          No growth after 24 hours . All specim...


1/28/19 Blood Culture - Preliminary, Resulted


          No growth after 24 hours . All specim...


1/28/19 Urine Culture, Received


          Pending





GME ATTESTATION


GME ATTESTATION


My faculty preceptor for this patient encounter was physically present during 

the encounter and was fully available. All aspects of the patient interview, 

examination, medical decision making process, and medical care plan development 

were reviewed and approved by the faculty preceptor. The faculty preceptor is 

aware and concurs with the plan as stated in the body of this note and will 

attest to such by his/her cosignature.





ATTENDING NOTE





I have both independently examined this patient as well as reviewed the note  I 

have discussed in detail the findings and plan of treatment as documented in the

note. I will continue to follow the patient and offer further guidance to the 

patients care as necessary during this hospital stay.





DAO Cordova MD, DO       Jan 30, 2019 08:27


LUCIA JAIMES MD                       Feb 2, 2019 10:43

## 2019-01-31 VITALS — SYSTOLIC BLOOD PRESSURE: 145 MMHG | DIASTOLIC BLOOD PRESSURE: 67 MMHG

## 2019-01-31 VITALS — DIASTOLIC BLOOD PRESSURE: 65 MMHG | SYSTOLIC BLOOD PRESSURE: 150 MMHG

## 2019-01-31 VITALS — SYSTOLIC BLOOD PRESSURE: 132 MMHG | DIASTOLIC BLOOD PRESSURE: 61 MMHG

## 2019-01-31 VITALS — DIASTOLIC BLOOD PRESSURE: 74 MMHG | SYSTOLIC BLOOD PRESSURE: 176 MMHG

## 2019-01-31 VITALS — SYSTOLIC BLOOD PRESSURE: 161 MMHG | DIASTOLIC BLOOD PRESSURE: 70 MMHG

## 2019-01-31 VITALS — SYSTOLIC BLOOD PRESSURE: 133 MMHG | DIASTOLIC BLOOD PRESSURE: 60 MMHG

## 2019-01-31 VITALS — DIASTOLIC BLOOD PRESSURE: 63 MMHG | SYSTOLIC BLOOD PRESSURE: 135 MMHG

## 2019-01-31 VITALS — DIASTOLIC BLOOD PRESSURE: 63 MMHG | SYSTOLIC BLOOD PRESSURE: 140 MMHG

## 2019-01-31 VITALS — DIASTOLIC BLOOD PRESSURE: 73 MMHG | SYSTOLIC BLOOD PRESSURE: 177 MMHG

## 2019-01-31 VITALS — DIASTOLIC BLOOD PRESSURE: 60 MMHG | SYSTOLIC BLOOD PRESSURE: 150 MMHG

## 2019-01-31 VITALS — SYSTOLIC BLOOD PRESSURE: 126 MMHG | DIASTOLIC BLOOD PRESSURE: 79 MMHG

## 2019-01-31 VITALS — SYSTOLIC BLOOD PRESSURE: 136 MMHG | DIASTOLIC BLOOD PRESSURE: 61 MMHG

## 2019-01-31 VITALS — DIASTOLIC BLOOD PRESSURE: 67 MMHG | SYSTOLIC BLOOD PRESSURE: 146 MMHG

## 2019-01-31 VITALS — SYSTOLIC BLOOD PRESSURE: 189 MMHG | DIASTOLIC BLOOD PRESSURE: 70 MMHG

## 2019-01-31 LAB
BUN SERPL-MCNC: 15 MG/DL (ref 7–18)
CALCIUM SERPL-MCNC: 7.9 MG/DL (ref 8.8–10.2)
CHLORIDE SERPL-SCNC: 101 MEQ/L (ref 98–107)
CO2 SERPL-SCNC: 27 MEQ/L (ref 21–32)
CREAT SERPL-MCNC: 0.71 MG/DL (ref 0.55–1.3)
GFR SERPL CREATININE-BSD FRML MDRD: > 60 ML/MIN/{1.73_M2} (ref 32–?)
GLUCOSE SERPL-MCNC: 176 MG/DL (ref 70–100)
HCT VFR BLD AUTO: 34.7 % (ref 36–47)
HGB BLD-MCNC: 11.4 G/DL (ref 12–15.5)
MAGNESIUM SERPL-MCNC: 1.6 MG/DL (ref 1.8–2.4)
MCH RBC QN AUTO: 30.6 PG (ref 27–33)
MCHC RBC AUTO-ENTMCNC: 32.9 G/DL (ref 32–36.5)
MCV RBC AUTO: 93.3 FL (ref 80–96)
PLATELET # BLD AUTO: 128 10^3/UL (ref 150–450)
POTASSIUM SERPL-SCNC: 4.1 MEQ/L (ref 3.5–5.1)
RBC # BLD AUTO: 3.72 10^6/UL (ref 4–5.4)
SODIUM SERPL-SCNC: 133 MEQ/L (ref 136–145)
WBC # BLD AUTO: 8.7 10^3/UL (ref 4–10)

## 2019-01-31 RX ADMIN — MINERAL OIL SCH EA: 100 ENEMA RECTAL at 11:32

## 2019-01-31 RX ADMIN — ONDANSETRON PRN MG: 2 INJECTION INTRAMUSCULAR; INTRAVENOUS at 12:08

## 2019-01-31 RX ADMIN — DOCUSATE SODIUM,SENNOSIDES SCH TAB: 50; 8.6 TABLET, FILM COATED ORAL at 20:34

## 2019-01-31 RX ADMIN — ONDANSETRON PRN MG: 2 INJECTION INTRAMUSCULAR; INTRAVENOUS at 09:01

## 2019-01-31 RX ADMIN — INSULIN LISPRO SCH UNITS: 100 INJECTION, SOLUTION INTRAVENOUS; SUBCUTANEOUS at 08:28

## 2019-01-31 RX ADMIN — Medication SCH UNITS: at 17:05

## 2019-01-31 RX ADMIN — Medication SCH EA: at 09:00

## 2019-01-31 RX ADMIN — SODIUM CHLORIDE SCH ML: 9 INJECTION, SOLUTION INTRAMUSCULAR; INTRAVENOUS; SUBCUTANEOUS at 06:32

## 2019-01-31 RX ADMIN — MORPHINE SULFATE PRN MG: 4 INJECTION INTRAVENOUS at 21:51

## 2019-01-31 RX ADMIN — ASPIRIN SCH MG: 81 TABLET ORAL at 08:29

## 2019-01-31 RX ADMIN — CLOPIDOGREL BISULFATE SCH MG: 75 TABLET ORAL at 08:28

## 2019-01-31 RX ADMIN — SODIUM CHLORIDE SCH UNITS: 4.5 INJECTION, SOLUTION INTRAVENOUS at 20:40

## 2019-01-31 RX ADMIN — DOCUSATE SODIUM,SENNOSIDES SCH TAB: 50; 8.6 TABLET, FILM COATED ORAL at 08:29

## 2019-01-31 RX ADMIN — INSULIN LISPRO SCH UNITS: 100 INJECTION, SOLUTION INTRAVENOUS; SUBCUTANEOUS at 16:54

## 2019-01-31 RX ADMIN — SODIUM CHLORIDE SCH UNITS: 4.5 INJECTION, SOLUTION INTRAVENOUS at 08:27

## 2019-01-31 RX ADMIN — POLYETHYLENE GLYCOL 3350 SCH PKT: 17 POWDER, FOR SOLUTION ORAL at 08:26

## 2019-01-31 RX ADMIN — LOSARTAN POTASSIUM SCH MG: 50 TABLET, FILM COATED ORAL at 08:28

## 2019-01-31 RX ADMIN — MORPHINE SULFATE PRN MG: 4 INJECTION INTRAVENOUS at 22:01

## 2019-01-31 RX ADMIN — SODIUM CHLORIDE SCH ML: 9 INJECTION, SOLUTION INTRAMUSCULAR; INTRAVENOUS; SUBCUTANEOUS at 17:05

## 2019-01-31 RX ADMIN — ATORVASTATIN CALCIUM SCH MG: 20 TABLET, FILM COATED ORAL at 08:30

## 2019-01-31 RX ADMIN — NYSTATIN SCH DOSE: 100000 POWDER TOPICAL at 20:40

## 2019-01-31 RX ADMIN — INSULIN LISPRO SCH UNITS: 100 INJECTION, SOLUTION INTRAVENOUS; SUBCUTANEOUS at 20:40

## 2019-01-31 RX ADMIN — ALLOPURINOL SCH MG: 300 TABLET ORAL at 08:29

## 2019-01-31 RX ADMIN — Medication SCH UNITS: at 06:32

## 2019-01-31 RX ADMIN — INSULIN LISPRO SCH UNITS: 100 INJECTION, SOLUTION INTRAVENOUS; SUBCUTANEOUS at 11:34

## 2019-01-31 RX ADMIN — PANTOPRAZOLE SODIUM SCH MG: 40 TABLET, DELAYED RELEASE ORAL at 08:29

## 2019-01-31 RX ADMIN — MORPHINE SULFATE PRN MG: 4 INJECTION INTRAVENOUS at 00:30

## 2019-01-31 RX ADMIN — NYSTATIN SCH DOSE: 100000 POWDER TOPICAL at 08:31

## 2019-01-31 NOTE — IPNPDOC
Date Seen


The patient was seen on 1/31/19.





Progress Note


SUBJECTIVE: Patient was seen and examined this morning. She has not had a bowel 

movement yet. She did have an episode of nausea this morning which Zofran was 

given. It was reported that her blood pressure was slightly elevated in the 

evenings systolic 160s to 170s. She was started on 12.5 Coreg and 50 mg 

losartan. IV fluids were discontinued. The were no other events reported 

overnight








OBJECTIVE


PHYSICAL EXAMINATION:


VITAL SIGNS: Please see below. 


GENERAL: Elderly woman, lying calmly in bed, not in any apparent distress. she 

is not pale, anicteric and afebrile


HEENT: Atraumatic. Neck: Supple.


LUNGS: Clear to auscultation bilaterally.


CARDIOVASCULAR: S1 and 2 heard, no murmurs, rubs or gallops.


ABDOMEN: Obese, diffuse tender. Hard stool palpated on the left quadrants. Hy

poactive bowel sounds


MUSCULOSKELETAL: Apparently within normal limits. 


EXTREMITIES: No pedal edema, 2+ bilateral pedal pulses noted. Left arm range of 

motion limited due to pain in the elbow.


NEUROLOGICAL: Awake, alert she is dysarthric  Bell's palsy noted on the right 

side of face. Right lateral tongue deviation. Left upper extremity range of 

motion limited due to pain in the elbow. left-sided homonymous hemianopsia.  








LABORATORY DATA, IMAGING STUDIES, MICROBIOLOGY: Please see below.





CT angiogram of the neck with IV contrast


1.  Dominant right vertebral artery shows high-grade stenosis at its origin. 

There is moderate calcification of the distal vertebral artery on the right as 

well. The left vertebral artery proximally is not seen.  Distally the left 

vertebral artery is quite tiny.


 


2.  There is moderate calcific plaquing at the origin of the internal carotid 

arteries bilaterally with a 75-80% stenosis of the right ICA and 50% stenosis of

the left.


 


Echocardiogram


Dr. Welch 01/28/2019


Normal sinus rhythm without intraventricular conduction disturbance..


IVC size slightly dilated but currently adequate respiratory collapse against a 

significantly elevated central venous pressure.


Cardiac source of embolic material is serious suspect, would recommend complete 

blood count, sedimentation rate, two sets of blood cultures  by 12 

hours and a transesophageal echocardiogram.





DVT prophylaxis ordered?: Yes TEDs and Sequential





ASSESSMENT AND PLAN: This is a 83-year-old woman who presented with Left upper 

extremity weakness and pain 1 day





PROBLEMS:








CVA - right posterior cerebral artery 


-Neurologic checks every 4 hours


-MRI: right posterior cerebral artery CVA


-Neurology consulted


-Continue with ASA and plavix 75 mg.


-CT angiogram of the neck positive for moderate stenosis 75-80% stenosis of the 

right ICA and 50% stenosis of the left. 


-Dr. Mac. Vascular surgeon .consult


-c/w Lipitor 40mg


-Lipid profile appropriate, A1c 9.5 currently on diabetic medication, TSH 1.94 

wNl


-Echocardiogram completed recommendations above. Dr. Jacome called to see if 

the PREETHI can be performed while the patient was admitted. he states to complete 

the current neurological workup and once the patient is stable to refer her 

outpatient to him for "a cardiac workup for stroke" and from there he will 

decide if she needs a PREETHI or loop recorder.


-Dr. Jaimes discussed with the patient about an endarterectomy patient states that 

she does not want to have surgeries even though there are risk for more strokes.

He discussed the risk and benefits of the procedure and she is adamant being 

DNR/DNI with no surgery. This is discussed with her daughter as well who c

oncurred with the patient's wishes.








Nausea/constipation


-CT abdomen reviewed by me can see stool throughout the colon


-No bowel movement since admission


-c/w Zofran 4 mg every 4 hours when necessary for nausea


-c/w MiraLAX, senna S and mineral oil


-Started mag citrate plus Fleet enema


-c/w Levaquin 500 mg daily by mouth





Hypertension


-stopped NS fluids.  


-c/w Coreg 12.5 mg BID and losartan 50 mg DAILY





Left Elbow pain / Chronic


-Has a history of left elbow replacements status post left elbow fracture


-imaging negative for fractures or DVT


-sumlimazex1


-Percocet and morphine when necessary for pain management


-awaiting home patches. 





Hyperlipidemia


-c/w Lipitor 40mg





Diabetes


-c/w ISS





GERD


-c/w protonix





Gout


-c/w Allopurinol 





Right-sided facial paralysis 2/2 to history of Bell's palsy





Rehabilitation


-PT and OT ordered


-ARU screen





CODE STATUS DNR/DNI


-Updated MOLST in the chart





Disposition transfer to [facility] with telemetry and ARU screen





VS, I&O, 24H, Fishbone


Vital Signs/I&O





Vital Signs








  Date Time  Temp Pulse Resp B/P (MAP) Pulse Ox O2 Delivery O2 Flow Rate FiO2


 


1/31/19 08:32   19  95   


 


1/31/19 08:30    173/73    


 


1/31/19 07:30 101.2 93      


 


1/30/19 04:00      Room Air  














I&O- Last 24 Hours up to 6 AM 


 


 1/31/19





 06:00


 


Intake Total 1280 ml


 


Output Total 690 ml


 


Balance 590 ml











Laboratory Data


24H LABS


Laboratory Tests 2


1/30/19 11:58: Bedside Glucose (Misc Panel) 184H


1/30/19 14:43: 


Urine Color YELLOW, Urine Appearance HAZY, Urine pH 5.0, Urine Specific Gravity 

1.025, Urine Protein NEGATIVE, Urine Glucose (UA) 1+H, Urine Ketones 1+H, Urine 

Blood 1+H, Urine Nitrite NEGATIVE, Urine Bilirubin NEGATIVE, Urine Urobilinogen 

0.2, Urine Leukocyte Esterase 2+H, Urine WBC (Auto) 77H, Urine RBC (Auto) 7H, 

Urine Hyaline Casts (Auto) 1, Urine Bacteria (Auto) 1+H, Urine Squamous 

Epithelial Cells 2, Urine Mucus (Auto) SMALL, Urine Sperm (Auto) 


1/30/19 16:24: Bedside Glucose (Misc Panel) 184H


1/30/19 20:51: Bedside Glucose (Misc Panel) 185H


1/31/19 04:31: 


Nucleated Red Blood Cells % (auto) 0.0, Anion Gap 5L, Glomerular Filtration Rate

> 60.0, Blood Urea Nitrogen 15, Creatinine 0.71, Sodium Level 133L, Potassium 

Level 4.1, Chloride Level 101, Carbon Dioxide Level 27, Calcium Level 7.9L, 

Magnesium Level 1.6L


CBC/BMP


Laboratory Tests


1/31/19 04:31








Red Blood Count 3.72 L, Mean Corpuscular Volume 93.3, Mean Corpuscular 

Hemoglobin 30.6, Mean Corpuscular Hemoglobin Concent 32.9, Red Cell Distribution

Width 13.2, Calcium Level 7.9 L


Microbiology





Microbiology


1/30/19 Blood Culture, Received


          Pending


1/30/19 Blood Culture - Preliminary, Resulted


          No growth after 24 hours . All specim...


1/28/19 Blood Culture - Preliminary, Resulted


          No Growth after 72 hours. All specime...


1/28/19 Blood Culture - Preliminary, Resulted


          No Growth after 72 hours. All specime...


1/30/19 Urine Culture - Final, Complete


          


1/28/19 Urine Culture - Final, Complete


          Proteus Mirabilis


          Enterococcus Faecalis





GME ATTESTATION


GME ATTESTATION


My faculty preceptor for this patient encounter was physically present during 

the encounter and was fully available. All aspects of the patient interview, 

examination, medical decision making process, and medical care plan development 

were reviewed and approved by the faculty preceptor. The faculty preceptor is 

aware and concurs with the plan as stated in the body of this note and will 

attest to such by his/her cosignature.





ATTENDING NOTE





I have both independently examined this patient as well as reviewed the note  I 

have discussed in detail the findings and plan of treatment as documented in the

note. I will continue to follow the patient and offer further guidance to the 

patients care as necessary during this hospital stay.





DAO Cordova MD, DO       Jan 31, 2019 10:53


LUCIA JAIMES MD                       Feb 2, 2019 10:43

## 2019-01-31 NOTE — REP
Clinical:  Nausea.

 

Technique:  Axial noncontrast images from the lung bases to the pubic symphysis

with coronal and sagittal re-formations.

 

Comparison:  06/26/2018.

 

Findings:

Lung bases demonstrate chronic fibroatelectatic changes and findings to suggest

mild pulmonary vascular congestion.

 

Liver, spleen, pancreas, gallbladder, bilateral adrenal glands and kidneys are

relatively normal / stable.  Simple left renal cyst is unchanged. Perinephric

stranding is nonspecific but should be correlated with urinalysis to exclude

pyelonephritis.  The enteric system demonstrates diffuse diverticulosis without

acute diverticulitis.  No acute obstruction or inflammatory process identified.

Normal terminal ileum and appendix are noted in the right lower quadrant.  Pelvis

demonstrates normal bladder and age-appropriate uterus/adnexa.  Extensive

atherosclerotic disease to the aorta and vasculature without aneurysm.  No

ascites.  No free air.  No adenopathy.  Degenerative changes the musculoskeletal

structures without focal osseous abnormality.

 

Impression:

1.  Predominantly stable chronic changes as noted above.

2.  Simple left renal cyst.

3.  Perinephric stranding is nonspecific but may warrant correlation with

urinalysis to exclude pyelonephritis.

4.  Diffuse diverticulosis without acute diverticulitis.

5.  No ascites, focal inflammatory stranding, free air, or adenopathy.

 

 

Electronically Signed by

Tomasz Rollins MD 01/31/2019 02:41 P

## 2019-02-01 VITALS — SYSTOLIC BLOOD PRESSURE: 122 MMHG | DIASTOLIC BLOOD PRESSURE: 58 MMHG

## 2019-02-01 VITALS — DIASTOLIC BLOOD PRESSURE: 67 MMHG | SYSTOLIC BLOOD PRESSURE: 128 MMHG

## 2019-02-01 VITALS — DIASTOLIC BLOOD PRESSURE: 62 MMHG | SYSTOLIC BLOOD PRESSURE: 136 MMHG

## 2019-02-01 VITALS — DIASTOLIC BLOOD PRESSURE: 60 MMHG | SYSTOLIC BLOOD PRESSURE: 132 MMHG

## 2019-02-01 VITALS — DIASTOLIC BLOOD PRESSURE: 65 MMHG | SYSTOLIC BLOOD PRESSURE: 146 MMHG

## 2019-02-01 VITALS — DIASTOLIC BLOOD PRESSURE: 70 MMHG | SYSTOLIC BLOOD PRESSURE: 160 MMHG

## 2019-02-01 VITALS — DIASTOLIC BLOOD PRESSURE: 62 MMHG | SYSTOLIC BLOOD PRESSURE: 144 MMHG

## 2019-02-01 VITALS — SYSTOLIC BLOOD PRESSURE: 150 MMHG | DIASTOLIC BLOOD PRESSURE: 63 MMHG

## 2019-02-01 LAB
BUN SERPL-MCNC: 14 MG/DL (ref 7–18)
CALCIUM SERPL-MCNC: 8 MG/DL (ref 8.8–10.2)
CHLORIDE SERPL-SCNC: 100 MEQ/L (ref 98–107)
CO2 SERPL-SCNC: 26 MEQ/L (ref 21–32)
CREAT SERPL-MCNC: 0.7 MG/DL (ref 0.55–1.3)
GFR SERPL CREATININE-BSD FRML MDRD: > 60 ML/MIN/{1.73_M2} (ref 32–?)
GLUCOSE SERPL-MCNC: 163 MG/DL (ref 70–100)
HCT VFR BLD AUTO: 35.6 % (ref 36–47)
HGB BLD-MCNC: 12 G/DL (ref 12–15.5)
MAGNESIUM SERPL-MCNC: 1.9 MG/DL (ref 1.8–2.4)
MCH RBC QN AUTO: 31.3 PG (ref 27–33)
MCHC RBC AUTO-ENTMCNC: 33.7 G/DL (ref 32–36.5)
MCV RBC AUTO: 93 FL (ref 80–96)
PLATELET # BLD AUTO: 117 10^3/UL (ref 150–450)
POTASSIUM SERPL-SCNC: 4.1 MEQ/L (ref 3.5–5.1)
RBC # BLD AUTO: 3.83 10^6/UL (ref 4–5.4)
SODIUM SERPL-SCNC: 135 MEQ/L (ref 136–145)
WBC # BLD AUTO: 9.1 10^3/UL (ref 4–10)

## 2019-02-01 RX ADMIN — ASPIRIN SCH MG: 81 TABLET ORAL at 08:33

## 2019-02-01 RX ADMIN — Medication SCH UNITS: at 17:56

## 2019-02-01 RX ADMIN — NYSTATIN SCH DOSE: 100000 POWDER TOPICAL at 08:31

## 2019-02-01 RX ADMIN — SODIUM CHLORIDE SCH UNITS: 4.5 INJECTION, SOLUTION INTRAVENOUS at 08:33

## 2019-02-01 RX ADMIN — PANTOPRAZOLE SODIUM SCH MG: 40 TABLET, DELAYED RELEASE ORAL at 08:32

## 2019-02-01 RX ADMIN — Medication SCH EA: at 09:00

## 2019-02-01 RX ADMIN — SODIUM CHLORIDE SCH ML: 9 INJECTION, SOLUTION INTRAMUSCULAR; INTRAVENOUS; SUBCUTANEOUS at 17:56

## 2019-02-01 RX ADMIN — INSULIN LISPRO SCH UNITS: 100 INJECTION, SOLUTION INTRAVENOUS; SUBCUTANEOUS at 21:00

## 2019-02-01 RX ADMIN — DOCUSATE SODIUM,SENNOSIDES SCH TAB: 50; 8.6 TABLET, FILM COATED ORAL at 21:39

## 2019-02-01 RX ADMIN — MORPHINE SULFATE PRN MG: 4 INJECTION INTRAVENOUS at 02:20

## 2019-02-01 RX ADMIN — ATORVASTATIN CALCIUM SCH MG: 20 TABLET, FILM COATED ORAL at 08:32

## 2019-02-01 RX ADMIN — LOSARTAN POTASSIUM SCH MG: 50 TABLET, FILM COATED ORAL at 08:32

## 2019-02-01 RX ADMIN — ALLOPURINOL SCH MG: 300 TABLET ORAL at 08:32

## 2019-02-01 RX ADMIN — CLOPIDOGREL BISULFATE SCH MG: 75 TABLET ORAL at 08:31

## 2019-02-01 RX ADMIN — INSULIN LISPRO SCH UNITS: 100 INJECTION, SOLUTION INTRAVENOUS; SUBCUTANEOUS at 08:33

## 2019-02-01 RX ADMIN — INSULIN LISPRO SCH UNITS: 100 INJECTION, SOLUTION INTRAVENOUS; SUBCUTANEOUS at 13:30

## 2019-02-01 RX ADMIN — DOCUSATE SODIUM,SENNOSIDES SCH TAB: 50; 8.6 TABLET, FILM COATED ORAL at 08:32

## 2019-02-01 RX ADMIN — MINERAL OIL SCH EA: 100 ENEMA RECTAL at 08:34

## 2019-02-01 RX ADMIN — Medication SCH UNITS: at 05:22

## 2019-02-01 RX ADMIN — NYSTATIN SCH DOSE: 100000 POWDER TOPICAL at 21:00

## 2019-02-01 RX ADMIN — INSULIN LISPRO SCH UNITS: 100 INJECTION, SOLUTION INTRAVENOUS; SUBCUTANEOUS at 17:56

## 2019-02-01 RX ADMIN — SODIUM CHLORIDE SCH ML: 9 INJECTION, SOLUTION INTRAMUSCULAR; INTRAVENOUS; SUBCUTANEOUS at 05:23

## 2019-02-01 RX ADMIN — SODIUM CHLORIDE SCH UNITS: 4.5 INJECTION, SOLUTION INTRAVENOUS at 21:40

## 2019-02-01 RX ADMIN — POLYETHYLENE GLYCOL 3350 SCH PKT: 17 POWDER, FOR SOLUTION ORAL at 08:31

## 2019-02-01 NOTE — IPNPDOC
Date Seen


The patient was seen on 2/1/19.





Progress Note


SUBJECTIVE: Patient was seen and examined this morning. She continues to have 

pain diffusely more prominent in her left quadrant. She has not had a 

significant bowel movement since starting her bowel care she will continue to 

receive them until she does have a bowel movement. When nursing discussed with 

the daughter and they're unable to obtain her Buprenorphine patch for the 

pharmacy won't dispense anymore until February 7 and they have no more at the 

house. She continues to have diffuse pain prominent in the left upper shoulder 

and back and just generalized tenderness. She had a little bit nausea this 

morning but more tolerable. Dr. Mac saw the patient daughter, the patient was 

sleeping, the night before, and made recommendation for CEA versus sent for 

stroke prevention. The daughter states that she will talk with her family 

members first before making a decision and he will follow up with them later 

today.








OBJECTIVE


PHYSICAL EXAMINATION:


VITAL SIGNS: Please see below. 


GENERAL: Elderly woman, lying calmly in bed, not in any apparent distress. she 

is not pale, anicteric and afebrile


HEENT: Atraumatic. Neck: Supple.


LUNGS: Clear to auscultation bilaterally.


CARDIOVASCULAR: S1 and 2 heard, no murmurs, rubs or gallops.


ABDOMEN: Obese, diffuse tender. Hard stool palpated on the left quadrants. 

Active bowel sounds


MUSCULOSKELETAL: Apparently within normal limits. 


EXTREMITIES: No pedal edema, 2+ bilateral pedal pulses noted. Left arm range of 

motion limited due to pain in the elbow.


NEUROLOGICAL: Awake, alert she is dysarthric  Bell's palsy noted on the right 

side of face. Right lateral tongue deviation. Left upper extremity range of 

motion limited due to pain in the elbow. left-sided homonymous hemianopsia.  








LABORATORY DATA, IMAGING STUDIES, MICROBIOLOGY: Please see below.





CT angiogram of the neck with IV contrast


1.  Dominant right vertebral artery shows high-grade stenosis at its origin. 

There is moderate calcification of the distal vertebral artery on the right as 

well. The left vertebral artery proximally is not seen.  Distally the left 

vertebral artery is quite tiny.


 


2.  There is moderate calcific plaquing at the origin of the internal carotid 

arteries bilaterally with a 75-80% stenosis of the right ICA and 50% stenosis of

the left.


 


Echocardiogram


Dr. Welch 01/28/2019


Normal sinus rhythm without intraventricular conduction disturbance..


IVC size slightly dilated but currently adequate respiratory collapse against a 

significantly elevated central venous pressure.


Cardiac source of embolic material is serious suspect, would recommend complete 

blood count, sedimentation rate, two sets of blood cultures  by 12 

hours and a transesophageal echocardiogram.





DVT prophylaxis ordered?: Yes TEDs and Sequential





ASSESSMENT AND PLAN: This is a 83-year-old woman who presented with Left upper 

extremity weakness and pain 1 day





PROBLEMS:





Right posterior cerebral artery CVA 


-Neurologic checks every 4 hours


-MRI: right posterior cerebral artery CVA


-Neurology consulted


-Continue with ASA and plavix 75 mg.


-CT angiogram of the neck positive for moderate stenosis 75-80% stenosis of the 

right ICA and 50% stenosis of the left. 


-Dr. Mac. Vascular surgeon consulted CEA versus stent -awaiting family's 

decision


-c/w Lipitor 40mg


-Lipid profile appropriate, A1c 9.5 currently on diabetic medication, TSH 1.94 

wNl


-Echocardiogram completed recommendations above. Dr. Jacome called to see if 

the PREETHI can be performed while the patient was admitted. he states to complete 

the current neurological workup and once the patient is stable to refer her 

outpatient to him for "a cardiac workup for stroke" and from there he will 

decide if she needs a PREETHI or loop recorder.


-Dr. Jaimes discussed with the patient about an endarterectomy patient states that 

she does not want to have surgeries even though there are risk for more strokes.

He discussed the risk and benefits of the procedure and she is adamant being 

DNR/DNI with no surgery. This is discussed with her daughter as well who 

concurred with the patient's wishes.








Nausea/constipation


-CT abdomen reviewed by me can see stool throughout the colon


-No bowel movement since admission


-c/w Zofran 4 mg every 4 hours when necessary for nausea


-c/w MiraLAX, senna S and mineral oil


-Started mag citrate plus Fleet enema


-c/w Levaquin 500 mg daily by mouth X5-7 days





Hypertension


-c/w Coreg 12.5 mg BID and losartan 50 mg DAILY





Left Elbow pain / Chronic


-Has a history of left elbow replacements status post left elbow fracture


-imaging negative for fractures or DVT


-Unable to obtain home pain patches


-Percocet and morphine 


-Pain management consulted





Hyperlipidemia


-c/w Lipitor 40mg





Diabetes


-c/w ISS





GERD


-c/w protonix





Gout


-c/w Allopurinol 





Right-sided facial paralysis 2/2 to history of Bell's palsy





Rehabilitation


-PT and OT ordered


-ARU screen





CODE STATUS DNR/DNI


-Updated MOLST in the chart





Disposition





VS, I&O, 24H, Fishbone


Vital Signs/I&O





Vital Signs








  Date Time  Temp Pulse Resp B/P (MAP) Pulse Ox O2 Delivery O2 Flow Rate FiO2


 


2/1/19 08:33  80      


 


2/1/19 08:32    128/67    


 


2/1/19 08:00 98.6  16  95   


 


1/30/19 04:00      Room Air  














I&O- Last 24 Hours up to 6 AM 


 


 2/1/19





 06:00


 


Intake Total 385 ml


 


Output Total 835 ml


 


Balance -450 ml











Laboratory Data


24H LABS


Laboratory Tests 2


1/31/19 16:48: Bedside Glucose (Misc Panel) 165H


1/31/19 20:38: Bedside Glucose (Misc Panel) 165H


2/1/19 04:26: 


Nucleated Red Blood Cells % (auto) 0.0, Anion Gap 9, Glomerular Filtration Rate 

> 60.0, Blood Urea Nitrogen 14, Creatinine 0.70, Sodium Level 135L, Potassium 

Level 4.1, Chloride Level 100, Carbon Dioxide Level 26, Calcium Level 8.0L, 

Magnesium Level 1.9


CBC/BMP


Laboratory Tests


2/1/19 04:26








Red Blood Count 3.83 L, Mean Corpuscular Volume 93.0, Mean Corpuscular 

Hemoglobin 31.3, Mean Corpuscular Hemoglobin Concent 33.7, Red Cell Distribution

Width 13.2, Calcium Level 8.0 L


Microbiology





Microbiology


1/30/19 Blood Culture - Preliminary, Resulted


          No Growth after 48 hours. All Specime...


1/30/19 Blood Culture - Preliminary, Resulted


          No Growth after 48 hours. All Specime...


1/28/19 Blood Culture - Preliminary, Resulted


          No Growth after 72 hours. All specime...


1/28/19 Blood Culture - Preliminary, Resulted


          No Growth after 72 hours. All specime...


1/30/19 Urine Culture - Final, Complete


          


1/28/19 Urine Culture - Final, Complete


          Proteus Mirabilis


          Enterococcus Faecalis





GME ATTESTATION


GME ATTESTATION


My faculty preceptor for this patient encounter was physically present during 

the encounter and was fully available. All aspects of the patient interview, 

examination, medical decision making process, and medical care plan development 

were reviewed and approved by the faculty preceptor. The faculty preceptor is 

aware and concurs with the plan as stated in the body of this note and will 

attest to such by his/her cosignature.





ATTENDING NOTE





I have both independently examined this patient as well as reviewed the note  I 

have discussed in detail the findings and plan of treatment as documented in the

note. I will continue to follow the patient and offer further guidance to the 

patients care as necessary during this hospital stay.





DAO Cordova MD, DO        Feb 1, 2019 11:50


LUCIA JAIMES MD                       Feb 2, 2019 10:43

## 2019-02-01 NOTE — CR
DATE OF CONSULTATION:  02/01/2019

 

CHIEF COMPLAINT:

1.  Generalized body pain.

3.  Left elbow pain - chronic.

 

REFERRING PHYSICIAN:  Dr. Misha Nava

 

HISTORY OF PRESENT ILLNESS:  Ashley is an 83-year-old female who suffers from

chronic pain and was receiving Butrans patch 20 mcg every 7 days through Dr. Gan at [facility].  I spoke with the daughter today at the patient's

bedside.  We discussed medication options.  Apparently they cannot get Butrans

patch at home at this moment and we do not have that on formulary at the

hospital.  The patient is resting comfortably during our visit. 

Medicated  early this morning with morphine that she has on order as needed.

Also has Percocet as needed.  Has not had a bowel movement since admission a few

days ago.  Discussed Nucynta (tapentadol) with the daughter.  It does have less

propensity for gastrointestinal (GI) upset.  Apparently she has tried multiple

medications over the years and tends to have GI upset with any by mouth pain

medications.  At this point after discussion with the daughter and caretaker I

would suggest trial of Nucynta 50 mg twice a day to see if there will be any

better pain control and in substitution of Butrans patch until she can be

evaluated by her pain doctor at [facility], Dr. Gan.  Of course the less

that we can use the Percocet or morphine the better she will be in regards to

constipation issues.

MTDD

## 2019-02-02 VITALS — DIASTOLIC BLOOD PRESSURE: 62 MMHG | SYSTOLIC BLOOD PRESSURE: 145 MMHG

## 2019-02-02 VITALS — SYSTOLIC BLOOD PRESSURE: 123 MMHG | DIASTOLIC BLOOD PRESSURE: 58 MMHG

## 2019-02-02 VITALS — SYSTOLIC BLOOD PRESSURE: 114 MMHG | DIASTOLIC BLOOD PRESSURE: 57 MMHG

## 2019-02-02 LAB
BUN SERPL-MCNC: 18 MG/DL (ref 7–18)
CALCIUM SERPL-MCNC: 8.1 MG/DL (ref 8.8–10.2)
CHLORIDE SERPL-SCNC: 99 MEQ/L (ref 98–107)
CO2 SERPL-SCNC: 29 MEQ/L (ref 21–32)
CREAT SERPL-MCNC: 0.74 MG/DL (ref 0.55–1.3)
GFR SERPL CREATININE-BSD FRML MDRD: > 60 ML/MIN/{1.73_M2} (ref 32–?)
GLUCOSE SERPL-MCNC: 166 MG/DL (ref 70–100)
HCT VFR BLD AUTO: 34.5 % (ref 36–47)
HGB BLD-MCNC: 11.6 G/DL (ref 12–15.5)
MAGNESIUM SERPL-MCNC: 1.9 MG/DL (ref 1.8–2.4)
MCH RBC QN AUTO: 30.9 PG (ref 27–33)
MCHC RBC AUTO-ENTMCNC: 33.6 G/DL (ref 32–36.5)
MCV RBC AUTO: 91.8 FL (ref 80–96)
PLATELET # BLD AUTO: 116 10^3/UL (ref 150–450)
POTASSIUM SERPL-SCNC: 4.2 MEQ/L (ref 3.5–5.1)
RBC # BLD AUTO: 3.76 10^6/UL (ref 4–5.4)
SODIUM SERPL-SCNC: 136 MEQ/L (ref 136–145)
WBC # BLD AUTO: 8.7 10^3/UL (ref 4–10)

## 2019-02-02 RX ADMIN — Medication SCH EA: at 09:00

## 2019-02-02 RX ADMIN — ALLOPURINOL SCH MG: 300 TABLET ORAL at 09:34

## 2019-02-02 RX ADMIN — NYSTATIN SCH DOSE: 100000 POWDER TOPICAL at 09:38

## 2019-02-02 RX ADMIN — SODIUM CHLORIDE SCH ML: 9 INJECTION, SOLUTION INTRAMUSCULAR; INTRAVENOUS; SUBCUTANEOUS at 17:39

## 2019-02-02 RX ADMIN — Medication PRN UNITS: at 11:34

## 2019-02-02 RX ADMIN — MINERAL OIL SCH EA: 100 ENEMA RECTAL at 09:37

## 2019-02-02 RX ADMIN — TAPENTADOL HYDROCHLORIDE SCH MG: 50 TABLET, FILM COATED ORAL at 21:01

## 2019-02-02 RX ADMIN — POLYETHYLENE GLYCOL 3350 SCH PKT: 17 POWDER, FOR SOLUTION ORAL at 09:33

## 2019-02-02 RX ADMIN — PANTOPRAZOLE SODIUM SCH MG: 40 TABLET, DELAYED RELEASE ORAL at 09:34

## 2019-02-02 RX ADMIN — NYSTATIN SCH DOSE: 100000 POWDER TOPICAL at 21:02

## 2019-02-02 RX ADMIN — ASPIRIN SCH MG: 81 TABLET ORAL at 09:34

## 2019-02-02 RX ADMIN — SODIUM CHLORIDE PRN ML: 9 INJECTION, SOLUTION INTRAMUSCULAR; INTRAVENOUS; SUBCUTANEOUS at 05:23

## 2019-02-02 RX ADMIN — CLOPIDOGREL BISULFATE SCH MG: 75 TABLET ORAL at 09:34

## 2019-02-02 RX ADMIN — Medication SCH UNITS: at 17:39

## 2019-02-02 RX ADMIN — SODIUM CHLORIDE SCH ML: 9 INJECTION, SOLUTION INTRAMUSCULAR; INTRAVENOUS; SUBCUTANEOUS at 05:22

## 2019-02-02 RX ADMIN — INSULIN LISPRO SCH UNITS: 100 INJECTION, SOLUTION INTRAVENOUS; SUBCUTANEOUS at 17:38

## 2019-02-02 RX ADMIN — INSULIN LISPRO SCH UNITS: 100 INJECTION, SOLUTION INTRAVENOUS; SUBCUTANEOUS at 09:32

## 2019-02-02 RX ADMIN — Medication PRN UNITS: at 05:23

## 2019-02-02 RX ADMIN — DOCUSATE SODIUM,SENNOSIDES SCH TAB: 50; 8.6 TABLET, FILM COATED ORAL at 21:00

## 2019-02-02 RX ADMIN — SODIUM CHLORIDE PRN ML: 9 INJECTION, SOLUTION INTRAMUSCULAR; INTRAVENOUS; SUBCUTANEOUS at 11:34

## 2019-02-02 RX ADMIN — SODIUM CHLORIDE SCH UNITS: 4.5 INJECTION, SOLUTION INTRAVENOUS at 09:34

## 2019-02-02 RX ADMIN — INSULIN LISPRO SCH UNITS: 100 INJECTION, SOLUTION INTRAVENOUS; SUBCUTANEOUS at 21:00

## 2019-02-02 RX ADMIN — SODIUM CHLORIDE SCH UNITS: 4.5 INJECTION, SOLUTION INTRAVENOUS at 21:00

## 2019-02-02 RX ADMIN — Medication SCH UNITS: at 05:22

## 2019-02-02 RX ADMIN — TAPENTADOL HYDROCHLORIDE SCH MG: 50 TABLET, FILM COATED ORAL at 09:50

## 2019-02-02 RX ADMIN — ATORVASTATIN CALCIUM SCH MG: 20 TABLET, FILM COATED ORAL at 09:34

## 2019-02-02 RX ADMIN — INSULIN LISPRO SCH UNITS: 100 INJECTION, SOLUTION INTRAVENOUS; SUBCUTANEOUS at 12:06

## 2019-02-02 RX ADMIN — DOCUSATE SODIUM,SENNOSIDES SCH TAB: 50; 8.6 TABLET, FILM COATED ORAL at 09:34

## 2019-02-02 RX ADMIN — LOSARTAN POTASSIUM SCH MG: 50 TABLET, FILM COATED ORAL at 09:50

## 2019-02-02 RX ADMIN — ONDANSETRON PRN MG: 2 INJECTION INTRAMUSCULAR; INTRAVENOUS at 17:39

## 2019-02-02 NOTE — REP
Clinical:  Abdominal pain with possible ischemic bowel.

 

Technique:  Axial contrast enhanced images from the lung bases to the pubic

symphysis using angiographic technique with multiplanar re-formations and 3-D

volumetric MIP images.

 

Comparison:  01/31/2019.

 

Findings:

Satisfactory enhancement of the aorta and arterial system is achieved.  Extensive

atherosclerotic calcifications are noted throughout the aorta and branch vessels

including at the origins of the celiac axis, superior mesenteric artery,

bilateral renal arteries.  No areas of decreased perfusion or occlusion are

identified.

 

Liver, spleen, pancreas, gallbladder, bilateral adrenal glands are normal.  The

kidneys demonstrate age-related cortical atrophic change with minimal areas of

cortical scarring and left lower pole renal cyst measuring 2.7 cm diameter.  The

enteric system is without obstruction or acute inflammatory process.  Diffuse

colonic and predominantly sigmoid diverticulosis noted without acute

diverticulitis.  No areas of pneumatosis are identified.  No free air or free

fluid/ascites appreciated.  Pelvis demonstrates Bowman catheter in collapsed

bladder and relatively normal uterus / adnexa.

 

Lung bases demonstrate chronic interstitial changes.  Musculoskeletal structures

demonstrate age-related degenerative change without focal osseous abnormality.

 

Impression:

1.  Advanced diffuse atherosclerotic disease with elements of narrowing suggested

at the origin of the celiac axis, SMA, and bilateral renal arteries but with

appropriate enhancement throughout the arterial system without areas of decreased

perfusion or occlusion.

2.  Chronic changes to the bilateral kidneys with 2.7 cm left renal cyst.

3.  Diverticulosis.

4.  No ascites, free air, focal inflammatory stranding, or adenopathy.

 

 

Electronically Signed by

Tomasz Rollins MD 02/02/2019 09:27 A

## 2019-02-02 NOTE — IPNPDOC
Date Seen


The patient was seen on 2/2/19.





Progress Note


SUBJECTIVE: Patient was seen and examined this morning. She continues to have 

pain diffusely in her abdomen. Unsure how much this is chronic pain versus a new

pain. Pain management was consulted and recommended Nucynta for pain control for

it has reduced GI upset. Patient still has not had a bowl movement. It has been 

noted by dietary that she is refusing to take only little bites of her meals. 

She denies having any nausea this morning. PT has worked with the patient and 

she demonstrate difficult initiating movement and assisting. She will need 

continued rehab after discharge. 








OBJECTIVE


PHYSICAL EXAMINATION:


VITAL SIGNS: Please see below. 


GENERAL: Elderly woman, lying calmly in bed, not in any apparent distress. she 

is not pale, anicteric and afebrile


HEENT: Atraumatic. Neck: Supple.


LUNGS: Clear to auscultation bilaterally.


CARDIOVASCULAR: S1 and 2 heard, no murmurs, rubs or gallops.


ABDOMEN: Obese, diffusly tender with deep palpation of the abdomen. Hypoactive 

bowel sounds


MUSCULOSKELETAL: Apparently within normal limits. 


EXTREMITIES: No pedal edema, 2+ bilateral pedal pulses noted. Left arm range of 

motion limited due to pain in the elbow.


NEUROLOGICAL: Awake, alert she is dysarthric  Bell's palsy noted on the right 

side of face. Right lateral tongue deviation. Left upper extremity range of 

motion limited due to pain in the elbow. left-sided homonymous hemianopsia.  








LABORATORY DATA, IMAGING STUDIES, MICROBIOLOGY: Please see below.





CT angiogram of the neck with IV contrast


1.  Dominant right vertebral artery shows high-grade stenosis at its origin. 

There is moderate calcification of the distal vertebral artery on the right as 

well. The left vertebral artery proximally is not seen.  Distally the left 

vertebral artery is quite tiny.


 


2.  There is moderate calcific plaquing at the origin of the internal carotid 

arteries bilaterally with a 75-80% stenosis of the right ICA and 50% stenosis of

the left.


 


Echocardiogram


Dr. Welch 01/28/2019


Normal sinus rhythm without intraventricular conduction disturbance..


IVC size slightly dilated but currently adequate respiratory collapse against a 

significantly elevated central venous pressure.


Cardiac source of embolic material is serious suspect, would recommend complete 

blood count, sedimentation rate, two sets of blood cultures  by 12 

hours and a transesophageal echocardiogram.





DVT prophylaxis ordered?: Yes TEDs and Sequential





ASSESSMENT AND PLAN: This is a 83-year-old woman who presented with Left upper 

extremity weakness and pain 1 day





PROBLEMS:





Abdominal pain constipation versus ischemic bowel?


-Decreased by mouth intake and not having bowel movements


-has history of atherosclerosis and recent stroke


-CT abdomen w/o contrast 01/31/2019-significant stool in her colon


-Kidney function is appropriate : CT angios of the abdomen and pelvis now to 

rule out ischemic bowel


-If negative will continue with current bowel regimen and pain control.


-c/w Levaquin 500 mg daily by mouth X5-7 days


-c/w MiraLAX, senna S mineral oil and zofran PRN





Right posterior cerebral artery CVA 


-Neurologic checks every 4 hours


-MRI: right posterior cerebral artery CVA


-Neurology consulted


-Continue with ASA and plavix 75 mg.


-CT angiogram of the neck positive for moderate stenosis 75-80% stenosis of the 

right ICA and 50% stenosis of the left. 


-Dr. Mac. Vascular surgeon consulted CEA versus stent -awaiting family's 

decision


-c/w Lipitor 40mg


-Lipid profile appropriate, A1c 9.5 currently on diabetic medication, TSH 1.94 

wNl


-Echocardiogram completed recommendations above. Dr. Jacome called to see if 

the PREETHI can be performed while the patient was admitted. he states to complete 

the current neurological workup and once the patient is stable to refer her 

outpatient to him for "a cardiac workup for stroke" and from there he will 

decide if she needs a PREETHI or loop recorder.


-Dr. Jaimes discussed with the patient about an endarterectomy patient states that 

she does not want to have surgeries even though there are risk for more strokes.

He discussed the risk and benefits of the procedure and she is adamant being 

DNR/DNI with no surgery. This is discussed with her daughter as well who 

concurred with the patient's wishes.








Hypertension


-c/w Coreg 12.5 mg BID and losartan 50 mg DAILY





Left Elbow pain / Chronic


-Has a history of left elbow replacements status post left elbow fracture


-imaging negative for fractures or DVT


-Unable to obtain home pain patches


-Percocet and morphine PRN


-Pain management consulted


-Recommendations: Nucynta 50mg bid





Hyperlipidemia


-c/w Lipitor 40mg





Diabetes


-c/w ISS





GERD


-c/w protonix





Gout


-c/w Allopurinol 





Right-sided facial paralysis 2/2 to history of Bell's palsy





Rehabilitation


-PT and OT:  Continued rehabilitation after discharge


-ARU screen





CODE STATUS DNR/DNI





VS, I&O, 24H, Fishbone


Vital Signs/I&O





Vital Signs








  Date Time  Temp Pulse Resp B/P (MAP) Pulse Ox O2 Delivery O2 Flow Rate FiO2


 


2/2/19 04:35   20     


 


2/1/19 22:00 97.6 82  122/58 (79) 93   


 


1/30/19 04:00      Room Air  














I&O- Last 24 Hours up to 6 AM 


 


 2/2/19





 06:00


 


Intake Total 150 ml


 


Output Total 750 ml


 


Balance -600 ml











Laboratory Data


24H LABS


Laboratory Tests 2


2/1/19 11:52: Bedside Glucose (Misc Panel) 190H


2/1/19 17:19: Bedside Glucose (Misc Panel) 174H


2/1/19 20:53: Bedside Glucose (Misc Panel) 168H


2/2/19 05:31: 


Nucleated Red Blood Cells % (auto) 0.0, Anion Gap 8, Glomerular Filtration Rate 

> 60.0, Blood Urea Nitrogen 18, Creatinine 0.74, Sodium Level 136, Potassium 

Level 4.2, Chloride Level 99, Carbon Dioxide Level 29, Calcium Level 8.1L, 

Magnesium Level 1.9


CBC/BMP


Laboratory Tests


2/2/19 05:31








Red Blood Count 3.76 L, Mean Corpuscular Volume 91.8, Mean Corpuscular 

Hemoglobin 30.9, Mean Corpuscular Hemoglobin Concent 33.6, Red Cell Distribution

Width 13.3, Calcium Level 8.1 L


Microbiology





Microbiology


1/30/19 Blood Culture - Preliminary, Resulted


          No Growth after 48 hours. All Specime...


1/30/19 Blood Culture - Preliminary, Resulted


          No Growth after 48 hours. All Specime...


1/28/19 Blood Culture - Preliminary, Resulted


          No Growth after 72 hours. All specime...


1/28/19 Blood Culture - Preliminary, Resulted


          No Growth after 72 hours. All specime...


1/30/19 Urine Culture - Final, Complete


          


1/28/19 Urine Culture - Final, Complete


          Proteus Mirabilis


          Enterococcus Faecalis





GME ATTESTATION


GME ATTESTATION


My faculty preceptor for this patient encounter was physically present during 

the encounter and was fully available. All aspects of the patient interview, 

examination, medical decision making process, and medical care plan development 

were reviewed and approved by the faculty preceptor. The faculty preceptor is 

aware and concurs with the plan as stated in the body of this note and will 

attest to such by his/her cosignature.





ATTENDING NOTE





I have both independently examined this patient as well as reviewed the note  I 

have discussed in detail the findings and plan of treatment as documented in the

note. I will continue to follow the patient and offer further guidance to the 

patients care as necessary during this hospital stay.





DAO Cordova MD, DO        Feb 2, 2019 06:21


LUCIA JAIMES MD                       Feb 2, 2019 11:02

## 2019-02-03 VITALS — SYSTOLIC BLOOD PRESSURE: 132 MMHG | DIASTOLIC BLOOD PRESSURE: 67 MMHG

## 2019-02-03 VITALS — DIASTOLIC BLOOD PRESSURE: 69 MMHG | SYSTOLIC BLOOD PRESSURE: 134 MMHG

## 2019-02-03 LAB
BUN SERPL-MCNC: 23 MG/DL (ref 7–18)
CALCIUM SERPL-MCNC: 8.2 MG/DL (ref 8.8–10.2)
CHLORIDE SERPL-SCNC: 100 MEQ/L (ref 98–107)
CO2 SERPL-SCNC: 26 MEQ/L (ref 21–32)
CREAT SERPL-MCNC: 0.74 MG/DL (ref 0.55–1.3)
GFR SERPL CREATININE-BSD FRML MDRD: > 60 ML/MIN/{1.73_M2} (ref 32–?)
GLUCOSE SERPL-MCNC: 153 MG/DL (ref 70–100)
HCT VFR BLD AUTO: 34.8 % (ref 36–47)
HGB BLD-MCNC: 11.7 G/DL (ref 12–15.5)
MAGNESIUM SERPL-MCNC: 1.8 MG/DL (ref 1.8–2.4)
MCH RBC QN AUTO: 31.7 PG (ref 27–33)
MCHC RBC AUTO-ENTMCNC: 33.6 G/DL (ref 32–36.5)
MCV RBC AUTO: 94.3 FL (ref 80–96)
PLATELET # BLD AUTO: 162 10^3/UL (ref 150–450)
POTASSIUM SERPL-SCNC: 4 MEQ/L (ref 3.5–5.1)
RBC # BLD AUTO: 3.69 10^6/UL (ref 4–5.4)
SODIUM SERPL-SCNC: 137 MEQ/L (ref 136–145)
WBC # BLD AUTO: 8.5 10^3/UL (ref 4–10)

## 2019-02-03 RX ADMIN — ALLOPURINOL SCH MG: 300 TABLET ORAL at 10:07

## 2019-02-03 RX ADMIN — SODIUM CHLORIDE SCH ML: 9 INJECTION, SOLUTION INTRAMUSCULAR; INTRAVENOUS; SUBCUTANEOUS at 17:50

## 2019-02-03 RX ADMIN — POLYETHYLENE GLYCOL 3350 SCH PKT: 17 POWDER, FOR SOLUTION ORAL at 22:26

## 2019-02-03 RX ADMIN — SODIUM CHLORIDE PRN ML: 9 INJECTION, SOLUTION INTRAMUSCULAR; INTRAVENOUS; SUBCUTANEOUS at 10:39

## 2019-02-03 RX ADMIN — MICONAZOLE NITRATE SCH DOSE: 2 CREAM VAGINAL at 00:46

## 2019-02-03 RX ADMIN — Medication PRN UNITS: at 05:10

## 2019-02-03 RX ADMIN — NYSTATIN SCH DOSE: 100000 POWDER TOPICAL at 22:24

## 2019-02-03 RX ADMIN — INSULIN LISPRO SCH UNITS: 100 INJECTION, SOLUTION INTRAVENOUS; SUBCUTANEOUS at 21:00

## 2019-02-03 RX ADMIN — INSULIN LISPRO SCH UNITS: 100 INJECTION, SOLUTION INTRAVENOUS; SUBCUTANEOUS at 13:31

## 2019-02-03 RX ADMIN — SODIUM CHLORIDE SCH UNITS: 4.5 INJECTION, SOLUTION INTRAVENOUS at 22:26

## 2019-02-03 RX ADMIN — LOSARTAN POTASSIUM SCH MG: 50 TABLET, FILM COATED ORAL at 10:08

## 2019-02-03 RX ADMIN — MINERAL OIL SCH EA: 100 ENEMA RECTAL at 13:31

## 2019-02-03 RX ADMIN — INSULIN LISPRO SCH UNITS: 100 INJECTION, SOLUTION INTRAVENOUS; SUBCUTANEOUS at 17:48

## 2019-02-03 RX ADMIN — CLOPIDOGREL BISULFATE SCH MG: 75 TABLET ORAL at 10:07

## 2019-02-03 RX ADMIN — DOCUSATE SODIUM,SENNOSIDES SCH TAB: 50; 8.6 TABLET, FILM COATED ORAL at 10:07

## 2019-02-03 RX ADMIN — SODIUM CHLORIDE PRN ML: 9 INJECTION, SOLUTION INTRAMUSCULAR; INTRAVENOUS; SUBCUTANEOUS at 05:10

## 2019-02-03 RX ADMIN — POLYETHYLENE GLYCOL 3350 SCH PKT: 17 POWDER, FOR SOLUTION ORAL at 10:05

## 2019-02-03 RX ADMIN — ASPIRIN SCH MG: 81 TABLET ORAL at 10:07

## 2019-02-03 RX ADMIN — Medication PRN UNITS: at 10:39

## 2019-02-03 RX ADMIN — Medication SCH UNITS: at 05:09

## 2019-02-03 RX ADMIN — Medication SCH UNITS: at 17:49

## 2019-02-03 RX ADMIN — TAPENTADOL HYDROCHLORIDE SCH MG: 50 TABLET, FILM COATED ORAL at 11:39

## 2019-02-03 RX ADMIN — SODIUM CHLORIDE SCH ML: 9 INJECTION, SOLUTION INTRAMUSCULAR; INTRAVENOUS; SUBCUTANEOUS at 05:09

## 2019-02-03 RX ADMIN — INSULIN LISPRO SCH UNITS: 100 INJECTION, SOLUTION INTRAVENOUS; SUBCUTANEOUS at 10:06

## 2019-02-03 RX ADMIN — MICONAZOLE NITRATE SCH DOSE: 2 CREAM VAGINAL at 22:24

## 2019-02-03 RX ADMIN — DOCUSATE SODIUM,SENNOSIDES SCH TAB: 50; 8.6 TABLET, FILM COATED ORAL at 22:25

## 2019-02-03 RX ADMIN — ONDANSETRON PRN MG: 2 INJECTION INTRAMUSCULAR; INTRAVENOUS at 13:35

## 2019-02-03 RX ADMIN — PANTOPRAZOLE SODIUM SCH MG: 40 TABLET, DELAYED RELEASE ORAL at 10:07

## 2019-02-03 RX ADMIN — Medication SCH EA: at 09:00

## 2019-02-03 RX ADMIN — SODIUM CHLORIDE SCH UNITS: 4.5 INJECTION, SOLUTION INTRAVENOUS at 10:05

## 2019-02-03 RX ADMIN — ATORVASTATIN CALCIUM SCH MG: 20 TABLET, FILM COATED ORAL at 10:05

## 2019-02-03 RX ADMIN — NYSTATIN SCH DOSE: 100000 POWDER TOPICAL at 10:08

## 2019-02-04 VITALS — SYSTOLIC BLOOD PRESSURE: 111 MMHG | DIASTOLIC BLOOD PRESSURE: 56 MMHG

## 2019-02-04 VITALS — DIASTOLIC BLOOD PRESSURE: 58 MMHG | SYSTOLIC BLOOD PRESSURE: 127 MMHG

## 2019-02-04 VITALS — DIASTOLIC BLOOD PRESSURE: 54 MMHG | SYSTOLIC BLOOD PRESSURE: 124 MMHG

## 2019-02-04 LAB
BUN SERPL-MCNC: 25 MG/DL (ref 7–18)
CALCIUM SERPL-MCNC: 8.3 MG/DL (ref 8.8–10.2)
CHLORIDE SERPL-SCNC: 101 MEQ/L (ref 98–107)
CO2 SERPL-SCNC: 28 MEQ/L (ref 21–32)
CREAT SERPL-MCNC: 0.7 MG/DL (ref 0.55–1.3)
GFR SERPL CREATININE-BSD FRML MDRD: > 60 ML/MIN/{1.73_M2} (ref 32–?)
GLUCOSE SERPL-MCNC: 182 MG/DL (ref 70–100)
HCT VFR BLD AUTO: 35.1 % (ref 36–47)
HGB BLD-MCNC: 11.9 G/DL (ref 12–15.5)
MAGNESIUM SERPL-MCNC: 1.8 MG/DL (ref 1.8–2.4)
MCH RBC QN AUTO: 31.9 PG (ref 27–33)
MCHC RBC AUTO-ENTMCNC: 33.9 G/DL (ref 32–36.5)
MCV RBC AUTO: 94.1 FL (ref 80–96)
PLATELET # BLD AUTO: 108 10^3/UL (ref 150–450)
POTASSIUM SERPL-SCNC: 4.2 MEQ/L (ref 3.5–5.1)
RBC # BLD AUTO: 3.73 10^6/UL (ref 4–5.4)
SODIUM SERPL-SCNC: 137 MEQ/L (ref 136–145)
WBC # BLD AUTO: 7.7 10^3/UL (ref 4–10)

## 2019-02-04 RX ADMIN — SODIUM CHLORIDE SCH ML: 9 INJECTION, SOLUTION INTRAMUSCULAR; INTRAVENOUS; SUBCUTANEOUS at 17:57

## 2019-02-04 RX ADMIN — POLYETHYLENE GLYCOL 3350 SCH PKT: 17 POWDER, FOR SOLUTION ORAL at 20:43

## 2019-02-04 RX ADMIN — INSULIN LISPRO SCH UNITS: 100 INJECTION, SOLUTION INTRAVENOUS; SUBCUTANEOUS at 14:11

## 2019-02-04 RX ADMIN — POLYETHYLENE GLYCOL 3350 SCH PKT: 17 POWDER, FOR SOLUTION ORAL at 08:52

## 2019-02-04 RX ADMIN — ONDANSETRON PRN MG: 2 INJECTION INTRAMUSCULAR; INTRAVENOUS at 00:44

## 2019-02-04 RX ADMIN — SODIUM CHLORIDE PRN ML: 9 INJECTION, SOLUTION INTRAMUSCULAR; INTRAVENOUS; SUBCUTANEOUS at 00:44

## 2019-02-04 RX ADMIN — INSULIN LISPRO SCH UNITS: 100 INJECTION, SOLUTION INTRAVENOUS; SUBCUTANEOUS at 08:52

## 2019-02-04 RX ADMIN — SODIUM CHLORIDE SCH UNITS: 4.5 INJECTION, SOLUTION INTRAVENOUS at 20:45

## 2019-02-04 RX ADMIN — INSULIN LISPRO SCH UNITS: 100 INJECTION, SOLUTION INTRAVENOUS; SUBCUTANEOUS at 21:00

## 2019-02-04 RX ADMIN — Medication SCH EA: at 08:53

## 2019-02-04 RX ADMIN — NYSTATIN SCH DOSE: 100000 POWDER TOPICAL at 20:45

## 2019-02-04 RX ADMIN — CLOPIDOGREL BISULFATE SCH MG: 75 TABLET ORAL at 08:51

## 2019-02-04 RX ADMIN — DOCUSATE SODIUM,SENNOSIDES SCH TAB: 50; 8.6 TABLET, FILM COATED ORAL at 08:50

## 2019-02-04 RX ADMIN — Medication SCH UNITS: at 06:00

## 2019-02-04 RX ADMIN — SODIUM CHLORIDE SCH ML: 9 INJECTION, SOLUTION INTRAMUSCULAR; INTRAVENOUS; SUBCUTANEOUS at 06:00

## 2019-02-04 RX ADMIN — NYSTATIN SCH DOSE: 100000 POWDER TOPICAL at 08:53

## 2019-02-04 RX ADMIN — INSULIN LISPRO SCH UNITS: 100 INJECTION, SOLUTION INTRAVENOUS; SUBCUTANEOUS at 17:30

## 2019-02-04 RX ADMIN — DOCUSATE SODIUM,SENNOSIDES SCH TAB: 50; 8.6 TABLET, FILM COATED ORAL at 20:43

## 2019-02-04 RX ADMIN — LOSARTAN POTASSIUM SCH MG: 50 TABLET, FILM COATED ORAL at 08:51

## 2019-02-04 RX ADMIN — ATORVASTATIN CALCIUM SCH MG: 20 TABLET, FILM COATED ORAL at 08:50

## 2019-02-04 RX ADMIN — Medication PRN UNITS: at 00:44

## 2019-02-04 RX ADMIN — MINERAL OIL SCH EA: 100 ENEMA RECTAL at 08:52

## 2019-02-04 RX ADMIN — ALLOPURINOL SCH MG: 300 TABLET ORAL at 08:51

## 2019-02-04 RX ADMIN — Medication SCH UNITS: at 17:57

## 2019-02-04 RX ADMIN — SODIUM CHLORIDE SCH UNITS: 4.5 INJECTION, SOLUTION INTRAVENOUS at 08:52

## 2019-02-04 RX ADMIN — MICONAZOLE NITRATE SCH DOSE: 2 CREAM VAGINAL at 20:45

## 2019-02-04 RX ADMIN — ASPIRIN SCH MG: 81 TABLET ORAL at 08:50

## 2019-02-04 RX ADMIN — ONDANSETRON PRN MG: 2 INJECTION INTRAMUSCULAR; INTRAVENOUS at 16:45

## 2019-02-04 RX ADMIN — PANTOPRAZOLE SODIUM SCH MG: 40 TABLET, DELAYED RELEASE ORAL at 08:51

## 2019-02-04 NOTE — IPNPDOC
Text Note


Date of Service


The patient was seen on 2/4/19.





NOTE


The patient is seen and examined.  As per nursing staff, the patient is having


poor oral intake.  Follows simple commands. Denies any chest pain, pressure or 

discomfort.  Further history limited. 





PHYSICAL EXAMINATION:


GENERAL:  The patient is alert. Elderly.  Lethargic.


HEENT:  Normocephalic, atraumatic.


PULMONARY:  Bilaterally clear.


CARDIAC:  Regular.  S1, S2.


ABDOMEN:  Obese, soft, nontender.  Hypoactive bowel sounds.


EXTREMITIES:  No edema in bilateral lower extremities.  Dorsalis pedis and


posterior tibialis pulses intact.


NEUROLOGIC:  Right sided facial droop.  Right lateral tongue deviation.  Left


upper extremity range of motion has been limited due to pain, which has been


chronic.  Left sided hemianopsia.


 


ASSESSMENT AND PLAN:


This is an 83-year-old female patient with underlying medical history of chronic


left elbow pain, hypertension, dyslipidemia, diabetes mellitus, gastroesophageal


reflux disease (GERD), gout, history of Bell's palsy with right sided facial


paralysis with history of prior stroke, who presented initially with left upper


extremity pain that is worsening.  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


stroke with left upper extremity weakness.


 


1.  Left posterior cerebral artery CVA, neuro checks and MRI appreciated.


Neurology consulted.  Aspirin and Plavix.  CT angio of the neck positive for


stenosis, 75 to 80% of the right internal carotid artery, 50% of the left.


Vascular surgery has been consulted.  Option of stenting and carotid


endarterectomy has been discussed with the patient and family.  The patient


initially refused surgery awaiting family for further discussion.  Continue


statin.  A1/c appreciated.  Lipid profile appreciated.  Echocardiogram has been


appreciated as well.  Transesophageal has also been discussed with cardiology,


who wanted the patient to be referred for outpatient workup for stroke.  Dr. Jacome does not believe the patient needs an urgent transesophageal


echocardiogram at this point.


 


2.  Abdominal pain and constipation.  CT scan appreciated.  Likely secondary to


narcotic constipation due to narcotics.  Relistor has been


prescribed. Bowel medication has also been prescribed. We will monitor


clinically. 


 


3.  Dyslipidemia.  Continue statin.


 


4.  Diabetes.  Continue insulin as ordered.  Followup fingersticks.


 


5.  Urinary tract infection (UTI).  The patient is on Levaquin.


 


6.  Gastroesophageal reflux disease (GERD).  Continue proton pump inhibitor.


 


7.  Gout.  Continue allopurinol.


 


8.  Right sided facial paralysis, history of Bell's palsy.  Currently with


stroke.


 


9.  Severe deconditioning.  The patient is almost bed bound.


 


10.  Hypertension.  Continue Coreg, aspirin, Plavix, Lipitor, losartan.





11. Poor oral intake, dietary consult. calorie count


 


11.  Deep vein thrombosis (DVT) prophylaxis.  Heparin subcutaneously.


 


12.  Chronic arm pain and left elbow pain.  Pain management consulted, but the


patient currently is oversedated with poor oral intake.  We will hold Nucynta at


this time and Percocet dose has also been reduced.  The patient currently is


oversedated.


 


DISPOSITION:  Pending clinical improvement, short term rehabilitation.  The


patient has poor oral intake and poor long-term prognosis. calorie count, 

dietary consult





VS,Elio, I+O


VS, Elio I+O


Laboratory Tests


2/4/19 06:09








Red Blood Count 3.73 L, Mean Corpuscular Volume 94.1, Mean Corpuscular 

Hemoglobin 31.9, Mean Corpuscular Hemoglobin Concent 33.9, Red Cell Distribution

Width 13.6, Calcium Level 8.3 L








Vital Signs








  Date Time  Temp Pulse Resp B/P (MAP) Pulse Ox O2 Delivery O2 Flow Rate FiO2


 


2/4/19 14:00 98.7 72 17 111/56 (74) 94   


 


1/30/19 04:00      Room Air  














I&O- Last 24 Hours up to 6 AM 


 


 2/4/19





 06:00


 


Intake Total 1440 ml


 


Output Total 550 ml


 


Balance 890 ml

















LUCIA JAIMES MD                       Feb 4, 2019 18:12

## 2019-02-04 NOTE — IPN
DATE:   02/03/2019

 

The patient is seen and examined.  As per nursing staff, the patient is having

poor oral intake and is mildly lethargic but arousable.  Follows simple 
commands.

Denies any chest pain, pressure or discomfort.  Further history limited.  As per

family, the patient reported some throat pain due to dryness and improved with a

bit of fluids.

 

VITAL SIGNS:  Temperature 97.8, pulse 74, respirations 16, blood pressure 
132/67,

pulse oximetry 96% on room air.

 

LABORATORY:

WBC 8.5, hemoglobin and hematocrit 11.7/34.8, platelets 162.

 

Chemistry:  Sodium 137, potassium 4.0, chloride 100, bicarbonate 26, BUN 22,

creatinine 0.74.

 

PHYSICAL EXAMINATION:

GENERAL:  The patient is alert. Elderly.  Lethargic.

HEENT:  Normocephalic, atraumatic.

PULMONARY:  Bilaterally clear.

CARDIAC:  Regular.  S1, S2.

ABDOMEN:  Obese, soft, nontender.  Hypoactive bowel sounds.

EXTREMITIES:  No edema in bilateral lower extremities.  Dorsalis pedis and

posterior tibialis pulses intact.

NEUROLOGIC:  Right sided facial droop.  Right lateral tongue deviation.  Left

upper extremity range of motion has been limited due to pain, which has been

chronic.  Left sided hemianopsia.

 

ASSESSMENT AND PLAN:

This is an 83-year-old female patient with underlying medical history of chronic

left elbow pain, hypertension, dyslipidemia, diabetes mellitus, gastroesophageal

reflux disease (GERD), gout, history of Bell's palsy with right sided facial

paralysis with history of prior stroke, who presented initially with left upper

extremity pain that is worsening.  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

stroke with left upper extremity weakness.

 

1.  Left posterior cerebral artery CVA, neuro checks and MRI appreciated.

Neurology consulted.  Aspirin and Plavix.  CT angio of the neck positive for

stenosis, 75 to 80% of the right internal carotid artery, 50% of the left.

Vascular surgery has been consulted.  Option of stenting and carotid

endarterectomy has been discussed with the patient and family.  The patient

initially refused surgery awaiting family for further discussion.  Continue

statin.  A1/c appreciated.  Lipid profile appreciated.  Echocardiogram has been

appreciated as well.  Transesophageal has also been discussed with cardiology,

who wanted the patient to be referred for outpatient workup for stroke.  Dr. Jacome does not believe the patient needs an urgent transesophageal

echocardiogram at this point.

 

2.  Abdominal pain and constipation.  CT scan appreciated.  Likely secondary to

narcotic constipation due to narcotics.  Relistor has been

prescribed. Bowel medication has also been prescribed. We will monitor

clinically.  The patient is having very small bowel movements.

 

3.  Dyslipidemia.  Continue statin.

 

4.  Diabetes.  Continue insulin as ordered.  Followup fingersticks.

 

5.  Urinary tract infection (UTI).  The patient is on Levaquin.

 

6.  Gastroesophageal reflux disease (GERD).  Continue proton pump inhibitor.

 

7.  Gout.  Continue allopurinol.

 

8.  Right sided facial paralysis, history of Bell's palsy.  Currently with

stroke.

 

9.  Severe deconditioning.  The patient is almost bed bound.

 

10.  Hypertension.  Continue Coreg, aspirin, Plavix, Lipitor, losartan.

 

11.  Deep vein thrombosis (DVT) prophylaxis.  Heparin subcutaneously.

 

12.  Chronic arm pain and left elbow pain.  Pain management consulted, but the

patient currently is oversedated with poor oral intake.  We will hold Nucynta at

this time and Percocet dose has also been reduced.  The patient currently is

oversedated.

 

DISPOSITION:  Pending clinical improvement, short term rehabilitation.  The

patient has poor oral intake and poor long-term prognosis.

MTDD

## 2019-02-05 VITALS — SYSTOLIC BLOOD PRESSURE: 129 MMHG | DIASTOLIC BLOOD PRESSURE: 74 MMHG

## 2019-02-05 VITALS — SYSTOLIC BLOOD PRESSURE: 134 MMHG | DIASTOLIC BLOOD PRESSURE: 69 MMHG

## 2019-02-05 VITALS — SYSTOLIC BLOOD PRESSURE: 130 MMHG | DIASTOLIC BLOOD PRESSURE: 59 MMHG

## 2019-02-05 LAB
BUN SERPL-MCNC: 20 MG/DL (ref 7–18)
CALCIUM SERPL-MCNC: 8.5 MG/DL (ref 8.8–10.2)
CHLORIDE SERPL-SCNC: 100 MEQ/L (ref 98–107)
CO2 SERPL-SCNC: 29 MEQ/L (ref 21–32)
CREAT SERPL-MCNC: 0.81 MG/DL (ref 0.55–1.3)
GFR SERPL CREATININE-BSD FRML MDRD: > 60 ML/MIN/{1.73_M2} (ref 32–?)
GLUCOSE SERPL-MCNC: 182 MG/DL (ref 70–100)
HCT VFR BLD AUTO: 35.2 % (ref 36–47)
HGB BLD-MCNC: 11.9 G/DL (ref 12–15.5)
MAGNESIUM SERPL-MCNC: 2 MG/DL (ref 1.8–2.4)
MCH RBC QN AUTO: 31.2 PG (ref 27–33)
MCHC RBC AUTO-ENTMCNC: 33.8 G/DL (ref 32–36.5)
MCV RBC AUTO: 92.1 FL (ref 80–96)
PLATELET # BLD AUTO: 133 10^3/UL (ref 150–450)
POTASSIUM SERPL-SCNC: 4 MEQ/L (ref 3.5–5.1)
RBC # BLD AUTO: 3.82 10^6/UL (ref 4–5.4)
SODIUM SERPL-SCNC: 136 MEQ/L (ref 136–145)
WBC # BLD AUTO: 8.2 10^3/UL (ref 4–10)

## 2019-02-05 RX ADMIN — DOCUSATE SODIUM,SENNOSIDES SCH TAB: 50; 8.6 TABLET, FILM COATED ORAL at 08:01

## 2019-02-05 RX ADMIN — INSULIN LISPRO SCH UNITS: 100 INJECTION, SOLUTION INTRAVENOUS; SUBCUTANEOUS at 18:19

## 2019-02-05 RX ADMIN — INSULIN LISPRO SCH UNITS: 100 INJECTION, SOLUTION INTRAVENOUS; SUBCUTANEOUS at 21:00

## 2019-02-05 RX ADMIN — NYSTATIN SCH DOSE: 100000 POWDER TOPICAL at 08:07

## 2019-02-05 RX ADMIN — ALLOPURINOL SCH MG: 300 TABLET ORAL at 08:00

## 2019-02-05 RX ADMIN — PANTOPRAZOLE SODIUM SCH MG: 40 TABLET, DELAYED RELEASE ORAL at 08:00

## 2019-02-05 RX ADMIN — SODIUM CHLORIDE SCH UNITS: 4.5 INJECTION, SOLUTION INTRAVENOUS at 21:17

## 2019-02-05 RX ADMIN — DOCUSATE SODIUM,SENNOSIDES SCH TAB: 50; 8.6 TABLET, FILM COATED ORAL at 21:17

## 2019-02-05 RX ADMIN — Medication SCH UNITS: at 05:08

## 2019-02-05 RX ADMIN — INSULIN LISPRO SCH UNITS: 100 INJECTION, SOLUTION INTRAVENOUS; SUBCUTANEOUS at 13:37

## 2019-02-05 RX ADMIN — LOSARTAN POTASSIUM SCH MG: 50 TABLET, FILM COATED ORAL at 08:04

## 2019-02-05 RX ADMIN — CLOPIDOGREL BISULFATE SCH MG: 75 TABLET ORAL at 08:01

## 2019-02-05 RX ADMIN — POLYETHYLENE GLYCOL 3350 SCH PKT: 17 POWDER, FOR SOLUTION ORAL at 08:06

## 2019-02-05 RX ADMIN — SODIUM CHLORIDE SCH ML: 9 INJECTION, SOLUTION INTRAMUSCULAR; INTRAVENOUS; SUBCUTANEOUS at 05:08

## 2019-02-05 RX ADMIN — INSULIN LISPRO SCH UNITS: 100 INJECTION, SOLUTION INTRAVENOUS; SUBCUTANEOUS at 08:05

## 2019-02-05 RX ADMIN — SODIUM CHLORIDE SCH ML: 9 INJECTION, SOLUTION INTRAMUSCULAR; INTRAVENOUS; SUBCUTANEOUS at 18:20

## 2019-02-05 RX ADMIN — Medication SCH UNITS: at 18:19

## 2019-02-05 RX ADMIN — POLYETHYLENE GLYCOL 3350 SCH PKT: 17 POWDER, FOR SOLUTION ORAL at 09:00

## 2019-02-05 RX ADMIN — MINERAL OIL SCH EA: 100 ENEMA RECTAL at 09:49

## 2019-02-05 RX ADMIN — ATORVASTATIN CALCIUM SCH MG: 20 TABLET, FILM COATED ORAL at 08:00

## 2019-02-05 RX ADMIN — Medication SCH EA: at 09:00

## 2019-02-05 RX ADMIN — SODIUM CHLORIDE SCH UNITS: 4.5 INJECTION, SOLUTION INTRAVENOUS at 08:06

## 2019-02-05 RX ADMIN — NYSTATIN SCH DOSE: 100000 POWDER TOPICAL at 21:12

## 2019-02-05 RX ADMIN — MICONAZOLE NITRATE SCH DOSE: 2 CREAM VAGINAL at 21:13

## 2019-02-05 RX ADMIN — POLYETHYLENE GLYCOL 3350 SCH PKT: 17 POWDER, FOR SOLUTION ORAL at 21:17

## 2019-02-05 RX ADMIN — ASPIRIN SCH MG: 81 TABLET ORAL at 08:01

## 2019-02-05 NOTE — IPNPDOC
Text Note


Date of Service


The patient was seen on 2/5/19.





NOTE


Subjective: Patient denies any new neuro deficits. She has been progressively 

declining over the past week, and has not been eating very well. I have 

discussed the this with her daughter, as well as her progressive decline, and 

she would like to entertain comfort measures only and speaking with hospice, 

however she would like to speak with her sister first.  Daughter notes that the 

patient would not want feeding tube.





Objective: 


Vitals: (see below) 


General: No acute distress, laying comfortably in bed.


HEENT: Moist mucous membranes.


Neck: No JVD or lymphadenopathy


Cardiac: RRR, No murmurs


Pulm: Clear to auscultation b/l. No wheezing, rhonchi


Abd: NT/ND + BS


Ext: No edema or cyanosis. Not cooperating with full neurologic exam. Does have 

a right facial droop, hemianopsia. Her left upper extremity is limited due to 

elbow pain.


Oriented to person only.


Moving all extremities. Following simple commands.





Labs (see below) 





Assessment/Plan


1. Acute left posterior cerebral artery CVA. Appreciate neurology input, with 

recommendations for aspirin, Plavix. Patient does have carotid stenosis, 75-80% 

on the right ICA, 50 present on the left. Vascular surgery has been consulted 

with options of stenting versus carotid endarterectomy. Patient had refused 

surgery. 


2. Protein calorie malnutrition- patient's diet has not been progressed. Would 

not have wanted a feeding tube. Will encourage by mouth intake. Calorie count.


3. Diabetes mellitus. Continue insulin regimen.


4. Hyperlipidemia on statin


5. UTI- status post Levaquin


6. Deconditioning, with max assist with PT. Progressive decline.


7. Hypertension controlled continue current meds


8. History of Bell's palsy


9. Chronic left arm and elbow pain. Pain management consulted. Percocet 

decreased as patient was getting lethargic with opiates.





DVT prophy: Heparin subcutaneous





The patient's prognosis is guarded. 





I had an extensive conversation with the daughter, who is aware of her 

progressive decline, and would like to entertain hospice, as well as comfort 

measures only after speaking with hospice. In the meantime she'll reach out to 

her sister, and further discussed this with the family.





VS,Fishbone, I+O


VS, Fishbone, I+O


Laboratory Tests


2/5/19 05:03








Red Blood Count 3.82 L, Mean Corpuscular Volume 92.1, Mean Corpuscular 

Hemoglobin 31.2, Mean Corpuscular Hemoglobin Concent 33.8, Red Cell Distribution

Width 13.7, Calcium Level 8.5 L








Vital Signs








  Date Time  Temp Pulse Resp B/P (MAP) Pulse Ox O2 Delivery O2 Flow Rate FiO2


 


2/5/19 08:04    139/80    


 


2/5/19 06:00 98.1 84 20  95   


 


1/30/19 04:00      Room Air  














I&O- Last 24 Hours up to 6 AM 


 


 2/5/19





 06:00


 


Intake Total 240 ml


 


Output Total 200 ml


 


Balance 40 ml

















NÉSTOR ESTRADA MD                  Feb 5, 2019 16:19

## 2019-02-06 VITALS — DIASTOLIC BLOOD PRESSURE: 81 MMHG | SYSTOLIC BLOOD PRESSURE: 130 MMHG

## 2019-02-06 VITALS — DIASTOLIC BLOOD PRESSURE: 58 MMHG | SYSTOLIC BLOOD PRESSURE: 129 MMHG

## 2019-02-06 VITALS — SYSTOLIC BLOOD PRESSURE: 134 MMHG | DIASTOLIC BLOOD PRESSURE: 60 MMHG

## 2019-02-06 LAB
BUN SERPL-MCNC: 21 MG/DL (ref 7–18)
CALCIUM SERPL-MCNC: 8.1 MG/DL (ref 8.8–10.2)
CHLORIDE SERPL-SCNC: 102 MEQ/L (ref 98–107)
CO2 SERPL-SCNC: 28 MEQ/L (ref 21–32)
CREAT SERPL-MCNC: 0.81 MG/DL (ref 0.55–1.3)
GFR SERPL CREATININE-BSD FRML MDRD: > 60 ML/MIN/{1.73_M2} (ref 32–?)
GLUCOSE SERPL-MCNC: 164 MG/DL (ref 70–100)
HCT VFR BLD AUTO: 35.7 % (ref 36–47)
HGB BLD-MCNC: 11.7 G/DL (ref 12–15.5)
MAGNESIUM SERPL-MCNC: 2 MG/DL (ref 1.8–2.4)
MCH RBC QN AUTO: 30.9 PG (ref 27–33)
MCHC RBC AUTO-ENTMCNC: 32.8 G/DL (ref 32–36.5)
MCV RBC AUTO: 94.2 FL (ref 80–96)
PLATELET # BLD AUTO: (no result) 10^3/UL (ref 150–450)
POTASSIUM SERPL-SCNC: 4 MEQ/L (ref 3.5–5.1)
RBC # BLD AUTO: 3.79 10^6/UL (ref 4–5.4)
SODIUM SERPL-SCNC: 137 MEQ/L (ref 136–145)
WBC # BLD AUTO: 7.1 10^3/UL (ref 4–10)

## 2019-02-06 RX ADMIN — POLYETHYLENE GLYCOL 3350 SCH PKT: 17 POWDER, FOR SOLUTION ORAL at 20:41

## 2019-02-06 RX ADMIN — Medication SCH EA: at 08:22

## 2019-02-06 RX ADMIN — LOSARTAN POTASSIUM SCH MG: 50 TABLET, FILM COATED ORAL at 08:21

## 2019-02-06 RX ADMIN — INSULIN LISPRO SCH UNITS: 100 INJECTION, SOLUTION INTRAVENOUS; SUBCUTANEOUS at 13:07

## 2019-02-06 RX ADMIN — Medication SCH UNITS: at 18:15

## 2019-02-06 RX ADMIN — ASPIRIN SCH MG: 81 TABLET ORAL at 08:26

## 2019-02-06 RX ADMIN — POLYETHYLENE GLYCOL 3350 SCH PKT: 17 POWDER, FOR SOLUTION ORAL at 08:22

## 2019-02-06 RX ADMIN — DOCUSATE SODIUM,SENNOSIDES SCH TAB: 50; 8.6 TABLET, FILM COATED ORAL at 08:20

## 2019-02-06 RX ADMIN — CLOPIDOGREL BISULFATE SCH MG: 75 TABLET ORAL at 08:21

## 2019-02-06 RX ADMIN — SODIUM CHLORIDE SCH UNITS: 4.5 INJECTION, SOLUTION INTRAVENOUS at 20:40

## 2019-02-06 RX ADMIN — NYSTATIN SCH DOSE: 100000 POWDER TOPICAL at 09:42

## 2019-02-06 RX ADMIN — PANTOPRAZOLE SODIUM SCH MG: 40 TABLET, DELAYED RELEASE ORAL at 08:21

## 2019-02-06 RX ADMIN — INSULIN LISPRO SCH UNITS: 100 INJECTION, SOLUTION INTRAVENOUS; SUBCUTANEOUS at 08:20

## 2019-02-06 RX ADMIN — MINERAL OIL SCH EA: 100 ENEMA RECTAL at 09:00

## 2019-02-06 RX ADMIN — ALLOPURINOL SCH MG: 300 TABLET ORAL at 08:21

## 2019-02-06 RX ADMIN — SODIUM CHLORIDE SCH ML: 9 INJECTION, SOLUTION INTRAMUSCULAR; INTRAVENOUS; SUBCUTANEOUS at 18:15

## 2019-02-06 RX ADMIN — DOCUSATE SODIUM,SENNOSIDES SCH TAB: 50; 8.6 TABLET, FILM COATED ORAL at 20:41

## 2019-02-06 RX ADMIN — ATORVASTATIN CALCIUM SCH MG: 20 TABLET, FILM COATED ORAL at 08:22

## 2019-02-06 RX ADMIN — SODIUM CHLORIDE SCH UNITS: 4.5 INJECTION, SOLUTION INTRAVENOUS at 08:20

## 2019-02-06 RX ADMIN — INSULIN LISPRO SCH UNITS: 100 INJECTION, SOLUTION INTRAVENOUS; SUBCUTANEOUS at 18:14

## 2019-02-06 RX ADMIN — Medication SCH UNITS: at 05:33

## 2019-02-06 RX ADMIN — NYSTATIN SCH DOSE: 100000 POWDER TOPICAL at 20:42

## 2019-02-06 RX ADMIN — MICONAZOLE NITRATE SCH DOSE: 2 CREAM VAGINAL at 20:42

## 2019-02-06 RX ADMIN — INSULIN LISPRO SCH UNITS: 100 INJECTION, SOLUTION INTRAVENOUS; SUBCUTANEOUS at 20:41

## 2019-02-06 RX ADMIN — SODIUM CHLORIDE SCH ML: 9 INJECTION, SOLUTION INTRAMUSCULAR; INTRAVENOUS; SUBCUTANEOUS at 05:33

## 2019-02-06 NOTE — IPNPDOC
Text Note


Date of Service


The patient was seen on 2/6/19.





NOTE


Subjective: Patient's denies any acute changes overnight. Denies any new neuro

logic changes.





Objective: 


Vitals: (see below) 


General: No acute distress, laying comfortably in bed.


HEENT: Moist mucous membranes.


Neck: No JVD or lymphadenopathy


Cardiac: RRR, No murmurs


Pulm: Clear to auscultation b/l. No wheezing, rhonchi


Abd: NT/ND + BS


Ext: No edema or cyanosis. Not cooperating with full neurologic exam. Does have 

a right facial droop, hemianopsia. Her left upper extremity is limited due to 

elbow pain.


Oriented to person only.


Moving all extremities. Following simple commands.





Labs (see below) 





Assessment/Plan


1. Acute left posterior cerebral artery CVA. Appreciate neurology input, with 

recommendations for aspirin, Plavix. Patient does have carotid stenosis, 75-80% 

on the right ICA, 50 present on the left. Vascular surgery has been consulted 

with options of stenting versus carotid endarterectomy. Patient had refused 

surgery. 


2. Protein calorie malnutrition- patient's diet has not been progressed. Would 

not have wanted a feeding tube. Will encourage by mouth intake. Calorie count.


3. Diabetes mellitus. Continue insulin regimen.


4. Hyperlipidemia on statin


5. UTI- status post Levaquin


6. Deconditioning, with max assist with PT. Progressive decline.


7. Hypertension controlled continue current meds


8. History of Bell's palsy


9. Chronic left arm and elbow pain. Pain management consulted. Percocet 

decreased as patient was getting lethargic with opiates.





DVT prophy: Heparin subcutaneous





The patient's prognosis is guarded. 





I had an extensive conversation with the daughter, who is aware of her 

progressive decline, and would like to entertain hospice, as well as comfort 

measures only after speaking with hospice. In the meantime she'll reach out to h

er sister, and further discussed this with the family.








She has not progressing well with physical therapy, and will likely need 

placement.





VS,Fishbone, I+O


VS, Fishbone, I+O


Laboratory Tests


2/6/19 05:34








Red Blood Count 3.79 L, Mean Corpuscular Volume 94.2, Mean Corpuscular 

Hemoglobin 30.9, Mean Corpuscular Hemoglobin Concent 32.8, Red Cell Distribution

Width 13.7, Calcium Level 8.1 L








Vital Signs








  Date Time  Temp Pulse Resp B/P (MAP) Pulse Ox O2 Delivery O2 Flow Rate FiO2


 


2/6/19 14:04   20     


 


2/6/19 08:21    130/81    


 


2/6/19 06:00 98.0 81   96   














I&O- Last 24 Hours up to 6 AM 


 


 2/6/19





 06:00


 


Intake Total 230 ml


 


Output Total 0 ml


 


Balance 230 ml

















NÉSTOR ESTRADA MD                  Feb 6, 2019 14:35

## 2019-02-07 VITALS — DIASTOLIC BLOOD PRESSURE: 62 MMHG | SYSTOLIC BLOOD PRESSURE: 141 MMHG

## 2019-02-07 VITALS — DIASTOLIC BLOOD PRESSURE: 63 MMHG | SYSTOLIC BLOOD PRESSURE: 136 MMHG

## 2019-02-07 LAB
BUN SERPL-MCNC: 25 MG/DL (ref 7–18)
CALCIUM SERPL-MCNC: 8.7 MG/DL (ref 8.8–10.2)
CHLORIDE SERPL-SCNC: 102 MEQ/L (ref 98–107)
CO2 SERPL-SCNC: 28 MEQ/L (ref 21–32)
CREAT SERPL-MCNC: 0.88 MG/DL (ref 0.55–1.3)
GFR SERPL CREATININE-BSD FRML MDRD: > 60 ML/MIN/{1.73_M2} (ref 32–?)
GLUCOSE SERPL-MCNC: 176 MG/DL (ref 70–100)
HCT VFR BLD AUTO: 36.1 % (ref 36–47)
HGB BLD-MCNC: 11.8 G/DL (ref 12–15.5)
MAGNESIUM SERPL-MCNC: 2 MG/DL (ref 1.8–2.4)
MCH RBC QN AUTO: 30.3 PG (ref 27–33)
MCHC RBC AUTO-ENTMCNC: 32.7 G/DL (ref 32–36.5)
MCV RBC AUTO: 92.8 FL (ref 80–96)
PLATELET # BLD AUTO: 131 10^3/UL (ref 150–450)
POTASSIUM SERPL-SCNC: 4.3 MEQ/L (ref 3.5–5.1)
RBC # BLD AUTO: 3.89 10^6/UL (ref 4–5.4)
SODIUM SERPL-SCNC: 137 MEQ/L (ref 136–145)
WBC # BLD AUTO: 7.8 10^3/UL (ref 4–10)

## 2019-02-07 RX ADMIN — ATORVASTATIN CALCIUM SCH MG: 20 TABLET, FILM COATED ORAL at 08:56

## 2019-02-07 RX ADMIN — ASPIRIN SCH MG: 81 TABLET ORAL at 08:54

## 2019-02-07 RX ADMIN — POLYETHYLENE GLYCOL 3350 SCH PKT: 17 POWDER, FOR SOLUTION ORAL at 08:56

## 2019-02-07 RX ADMIN — DOCUSATE SODIUM,SENNOSIDES SCH TAB: 50; 8.6 TABLET, FILM COATED ORAL at 08:56

## 2019-02-07 RX ADMIN — INSULIN LISPRO SCH UNITS: 100 INJECTION, SOLUTION INTRAVENOUS; SUBCUTANEOUS at 08:53

## 2019-02-07 RX ADMIN — POLYETHYLENE GLYCOL 3350 SCH PKT: 17 POWDER, FOR SOLUTION ORAL at 21:25

## 2019-02-07 RX ADMIN — Medication PRN UNITS: at 18:58

## 2019-02-07 RX ADMIN — Medication SCH UNITS: at 05:13

## 2019-02-07 RX ADMIN — NYSTATIN SCH DOSE: 100000 POWDER TOPICAL at 21:25

## 2019-02-07 RX ADMIN — PANTOPRAZOLE SODIUM SCH MG: 40 TABLET, DELAYED RELEASE ORAL at 08:56

## 2019-02-07 RX ADMIN — LOSARTAN POTASSIUM SCH MG: 50 TABLET, FILM COATED ORAL at 08:56

## 2019-02-07 RX ADMIN — INSULIN LISPRO SCH UNITS: 100 INJECTION, SOLUTION INTRAVENOUS; SUBCUTANEOUS at 21:00

## 2019-02-07 RX ADMIN — INSULIN LISPRO SCH UNITS: 100 INJECTION, SOLUTION INTRAVENOUS; SUBCUTANEOUS at 18:22

## 2019-02-07 RX ADMIN — INSULIN LISPRO SCH UNITS: 100 INJECTION, SOLUTION INTRAVENOUS; SUBCUTANEOUS at 13:14

## 2019-02-07 RX ADMIN — ALLOPURINOL SCH MG: 300 TABLET ORAL at 08:56

## 2019-02-07 RX ADMIN — SODIUM CHLORIDE SCH UNITS: 4.5 INJECTION, SOLUTION INTRAVENOUS at 21:24

## 2019-02-07 RX ADMIN — NYSTATIN SCH DOSE: 100000 POWDER TOPICAL at 09:16

## 2019-02-07 RX ADMIN — SODIUM CHLORIDE PRN ML: 9 INJECTION, SOLUTION INTRAMUSCULAR; INTRAVENOUS; SUBCUTANEOUS at 18:58

## 2019-02-07 RX ADMIN — SODIUM CHLORIDE SCH UNITS: 4.5 INJECTION, SOLUTION INTRAVENOUS at 08:53

## 2019-02-07 RX ADMIN — SODIUM CHLORIDE SCH ML: 9 INJECTION, SOLUTION INTRAMUSCULAR; INTRAVENOUS; SUBCUTANEOUS at 18:00

## 2019-02-07 RX ADMIN — MINERAL OIL SCH EA: 100 ENEMA RECTAL at 09:15

## 2019-02-07 RX ADMIN — ACETAMINOPHEN PRN MG: 325 TABLET ORAL at 16:53

## 2019-02-07 RX ADMIN — Medication SCH UNITS: at 18:22

## 2019-02-07 RX ADMIN — MICONAZOLE NITRATE SCH DOSE: 2 CREAM VAGINAL at 21:25

## 2019-02-07 RX ADMIN — SODIUM CHLORIDE SCH ML: 9 INJECTION, SOLUTION INTRAMUSCULAR; INTRAVENOUS; SUBCUTANEOUS at 05:13

## 2019-02-07 RX ADMIN — Medication SCH EA: at 09:00

## 2019-02-07 RX ADMIN — CLOPIDOGREL BISULFATE SCH MG: 75 TABLET ORAL at 08:54

## 2019-02-07 RX ADMIN — Medication PRN UNITS: at 18:24

## 2019-02-07 RX ADMIN — SODIUM CHLORIDE PRN ML: 9 INJECTION, SOLUTION INTRAMUSCULAR; INTRAVENOUS; SUBCUTANEOUS at 18:24

## 2019-02-07 RX ADMIN — ONDANSETRON PRN MG: 2 INJECTION INTRAMUSCULAR; INTRAVENOUS at 18:57

## 2019-02-07 RX ADMIN — DOCUSATE SODIUM,SENNOSIDES SCH TAB: 50; 8.6 TABLET, FILM COATED ORAL at 21:24

## 2019-02-07 NOTE — NUR
Pt has not been making progress in dysphagia therapy and is safe at current diet level. 
Discharging SLP services this date w/ recommendations for pureed solids and thin liquids. 
Encourage calorie dense liquids (ice cream, ensure). Assist w/ intake prn w/ pt in upright 
position. Please contact SLP w/ any questions or concerns.

-------------------------------------------------------------------------------

Addendum: 02/07/19 at 0837 by [facility]

-------------------------------------------------------------------------------

Amended: Links added.

## 2019-02-07 NOTE — IPNPDOC
Text Note


Date of Service


The patient was seen on 2/7/19.





NOTE


Subjective: Patient denies any acute complaints, with no neurologic changes. D

enies any chest pain or palpitations. No shortness of breath. Still has 

decreased appetite. No feeding tube per family.





Objective: 


Vitals: (see below) 


General: No acute distress, laying comfortably in bed.


HEENT: Moist mucous membranes.


Neck: No JVD or lymphadenopathy


Cardiac: RRR, No murmurs


Pulm: Clear to auscultation b/l. No wheezing, rhonchi


Abd: NT/ND + BS


Ext: No edema or cyanosis. Not cooperating with full neurologic exam. Does have 

a right facial droop, hemianopsia. Her left upper extremity is limited due to 

elbow pain.


Oriented to person only.


Moving all extremities. Following simple commands.





Labs (see below) 





Assessment/Plan


1. Acute left posterior cerebral artery CVA. Appreciate neurology input, with 

recommendations for aspirin, Plavix. Patient does have carotid stenosis, 75-80% 

on the right ICA, 50 present on the left. Vascular surgery has been consulted 

with options of stenting versus carotid endarterectomy. Patient had refused 

surgery. 


2. Protein calorie malnutrition- patient's diet has not been progressed. Would 

not have wanted a feeding tube. Will encourage by mouth intake. Calorie count.


3. Diabetes mellitus. Continue insulin regimen.


4. Hyperlipidemia on statin


5. UTI- status post Levaquin


6. Deconditioning, with max assist with PT. Progressive decline.


7. Hypertension controlled continue current meds


8. History of Bell's palsy


9. Chronic left arm and elbow pain. Pain management consulted. Percocet 

decreased as patient was getting lethargic with opiates.





DVT prophy: Heparin subcutaneous





The patient's prognosis is guarded. 





I had an extensive conversation with the daughter, who is aware of her 

progressive decline, and would like to entertain hospice, as well as comfort 

measures only after speaking with hospice. In the meantime she'll reach out to 

her sister, and further discussed this with the family.








This discussed with daughter Chelsey, who will be meeting with hospice on 2/8/19.





She has not progressing well with physical therapy, and will likely need 

placement.





VS,Fishbone, I+O


VS, Fishbone, I+O


Laboratory Tests


2/7/19 05:15








Red Blood Count 3.89 L, Mean Corpuscular Volume 92.8, Mean Corpuscular 

Hemoglobin 30.3, Mean Corpuscular Hemoglobin Concent 32.7, Red Cell Distribution

Width 13.6, Calcium Level 8.7 L








Vital Signs








  Date Time  Temp Pulse Resp B/P (MAP) Pulse Ox O2 Delivery O2 Flow Rate FiO2


 


2/7/19 10:44   17     


 


2/7/19 08:55    114/53    


 


2/7/19 06:00 97.2 73   95   














I&O- Last 24 Hours up to 6 AM 


 


 2/7/19





 06:00


 


Intake Total 420 ml


 


Output Total 0 ml


 


Balance 420 ml

















NÉSTOR ESTRADA MD                  Feb 7, 2019 14:19

## 2019-02-08 VITALS — DIASTOLIC BLOOD PRESSURE: 57 MMHG | SYSTOLIC BLOOD PRESSURE: 138 MMHG

## 2019-02-08 VITALS — DIASTOLIC BLOOD PRESSURE: 67 MMHG | SYSTOLIC BLOOD PRESSURE: 125 MMHG

## 2019-02-08 VITALS — SYSTOLIC BLOOD PRESSURE: 123 MMHG | DIASTOLIC BLOOD PRESSURE: 56 MMHG

## 2019-02-08 LAB
BUN SERPL-MCNC: 23 MG/DL (ref 7–18)
CALCIUM SERPL-MCNC: 8.9 MG/DL (ref 8.8–10.2)
CHLORIDE SERPL-SCNC: 102 MEQ/L (ref 98–107)
CO2 SERPL-SCNC: 28 MEQ/L (ref 21–32)
CREAT SERPL-MCNC: 0.79 MG/DL (ref 0.55–1.3)
GFR SERPL CREATININE-BSD FRML MDRD: > 60 ML/MIN/{1.73_M2} (ref 32–?)
GLUCOSE SERPL-MCNC: 173 MG/DL (ref 70–100)
HCT VFR BLD AUTO: 36.8 % (ref 36–47)
HGB BLD-MCNC: 12.3 G/DL (ref 12–15.5)
MAGNESIUM SERPL-MCNC: 2 MG/DL (ref 1.8–2.4)
MCH RBC QN AUTO: 30.8 PG (ref 27–33)
MCHC RBC AUTO-ENTMCNC: 33.4 G/DL (ref 32–36.5)
MCV RBC AUTO: 92 FL (ref 80–96)
PLATELET # BLD AUTO: 157 10^3/UL (ref 150–450)
POTASSIUM SERPL-SCNC: 4.1 MEQ/L (ref 3.5–5.1)
RBC # BLD AUTO: 4 10^6/UL (ref 4–5.4)
SODIUM SERPL-SCNC: 137 MEQ/L (ref 136–145)
WBC # BLD AUTO: 7.8 10^3/UL (ref 4–10)

## 2019-02-08 RX ADMIN — INSULIN LISPRO SCH UNITS: 100 INJECTION, SOLUTION INTRAVENOUS; SUBCUTANEOUS at 17:29

## 2019-02-08 RX ADMIN — INSULIN LISPRO SCH UNITS: 100 INJECTION, SOLUTION INTRAVENOUS; SUBCUTANEOUS at 21:00

## 2019-02-08 RX ADMIN — Medication SCH UNITS: at 06:43

## 2019-02-08 RX ADMIN — LOSARTAN POTASSIUM SCH MG: 50 TABLET, FILM COATED ORAL at 09:46

## 2019-02-08 RX ADMIN — INSULIN LISPRO SCH UNITS: 100 INJECTION, SOLUTION INTRAVENOUS; SUBCUTANEOUS at 09:46

## 2019-02-08 RX ADMIN — SODIUM CHLORIDE SCH ML: 9 INJECTION, SOLUTION INTRAMUSCULAR; INTRAVENOUS; SUBCUTANEOUS at 17:29

## 2019-02-08 RX ADMIN — SODIUM CHLORIDE SCH ML: 9 INJECTION, SOLUTION INTRAMUSCULAR; INTRAVENOUS; SUBCUTANEOUS at 06:00

## 2019-02-08 RX ADMIN — NYSTATIN SCH DOSE: 100000 POWDER TOPICAL at 09:47

## 2019-02-08 RX ADMIN — ALLOPURINOL SCH MG: 300 TABLET ORAL at 09:45

## 2019-02-08 RX ADMIN — POLYETHYLENE GLYCOL 3350 SCH PKT: 17 POWDER, FOR SOLUTION ORAL at 09:44

## 2019-02-08 RX ADMIN — NYSTATIN SCH DOSE: 100000 POWDER TOPICAL at 21:15

## 2019-02-08 RX ADMIN — POLYETHYLENE GLYCOL 3350 SCH PKT: 17 POWDER, FOR SOLUTION ORAL at 21:15

## 2019-02-08 RX ADMIN — Medication SCH UNITS: at 17:29

## 2019-02-08 RX ADMIN — ASPIRIN SCH MG: 81 TABLET ORAL at 09:45

## 2019-02-08 RX ADMIN — ATORVASTATIN CALCIUM SCH MG: 20 TABLET, FILM COATED ORAL at 09:44

## 2019-02-08 RX ADMIN — SODIUM CHLORIDE PRN ML: 9 INJECTION, SOLUTION INTRAMUSCULAR; INTRAVENOUS; SUBCUTANEOUS at 06:44

## 2019-02-08 RX ADMIN — ACETAMINOPHEN PRN MG: 325 TABLET ORAL at 11:29

## 2019-02-08 RX ADMIN — Medication PRN UNITS: at 06:44

## 2019-02-08 RX ADMIN — ACETAMINOPHEN PRN MG: 325 TABLET ORAL at 17:30

## 2019-02-08 RX ADMIN — PANTOPRAZOLE SODIUM SCH MG: 40 TABLET, DELAYED RELEASE ORAL at 09:45

## 2019-02-08 RX ADMIN — DOCUSATE SODIUM,SENNOSIDES SCH TAB: 50; 8.6 TABLET, FILM COATED ORAL at 09:45

## 2019-02-08 RX ADMIN — DOCUSATE SODIUM,SENNOSIDES SCH TAB: 50; 8.6 TABLET, FILM COATED ORAL at 21:14

## 2019-02-08 RX ADMIN — SODIUM CHLORIDE SCH UNITS: 4.5 INJECTION, SOLUTION INTRAVENOUS at 09:44

## 2019-02-08 RX ADMIN — SODIUM CHLORIDE SCH UNITS: 4.5 INJECTION, SOLUTION INTRAVENOUS at 21:14

## 2019-02-08 RX ADMIN — INSULIN LISPRO SCH UNITS: 100 INJECTION, SOLUTION INTRAVENOUS; SUBCUTANEOUS at 12:42

## 2019-02-08 RX ADMIN — Medication SCH EA: at 09:00

## 2019-02-08 RX ADMIN — CLOPIDOGREL BISULFATE SCH MG: 75 TABLET ORAL at 09:45

## 2019-02-08 RX ADMIN — MICONAZOLE NITRATE SCH DOSE: 2 CREAM VAGINAL at 21:16

## 2019-02-08 NOTE — IPNPDOC
Text Note


Date of Service


The patient was seen on 2/8/19.





NOTE


Subjective: I had an extensive conversation with the family, who would now like 

to entertain hospice at this time. They would like to attempt to bring in food 

from outside of the hospital as the patient may not be eating due to this.  

Following this, they will readdress the feeding tube with the patient. They 

would also like to avoid any oxycodone.





Objective: 


Vitals: (see below) 


General: No acute distress, laying comfortably in bed.


HEENT: Moist mucous membranes.


Neck: No JVD or lymphadenopathy


Cardiac: RRR, No murmurs


Pulm: Clear to auscultation b/l. No wheezing, rhonchi


Abd: NT/ND + BS


Ext: No edema or cyanosis. Not cooperating with full neurologic exam. Does have 

a right facial droop, hemianopsia. Her left upper extremity is limited due to 

elbow pain.


Oriented to person only.


Moving all extremities. Following simple commands.





Labs (see below) 





Assessment/Plan


1. Acute left posterior cerebral artery CVA. Appreciate neurology input, with 

recommendations for aspirin, Plavix. Patient does have carotid stenosis, 75-80% 

on the right ICA, 50 present on the left. Vascular surgery has been consulted 

with options of stenting versus carotid endarterectomy. Patient had refused 

surgery. 


2. Protein calorie malnutrition- patient's diet has not been progressed. Would 

not have wanted a feeding tube. Will encourage by mouth intake. Calorie count. 

Ensure


3. Diabetes mellitus. Continue insulin regimen.


4. Hyperlipidemia on statin


5. UTI- status post Levaquin


6. Deconditioning, with max assist with PT. Progressive decline.


7. Hypertension controlled continue current meds


8. History of Bell's palsy


9. Chronic left arm and elbow pain. Pain management consulted. Percocet 

discontinued as patient was getting lethargic with opiates.





DVT prophy: Heparin subcutaneous





The patient's prognosis is guarded. 





Family aware the patient will likely need placement. They would not like to 

entertain hospice at this time. They are aware of the guarded prognosis.





VS,Fishbone, I+O


VS, Fishbone, I+O


Laboratory Tests


2/8/19 05:38








Red Blood Count 4.00, Mean Corpuscular Volume 92.0, Mean Corpuscular Hemoglobin 

30.8, Mean Corpuscular Hemoglobin Concent 33.4, Red Cell Distribution Width 

13.5, Calcium Level 8.9








Vital Signs








  Date Time  Temp Pulse Resp B/P (MAP) Pulse Ox O2 Delivery O2 Flow Rate FiO2


 


2/8/19 14:00 96.6 70 17 123/56 (27) 95   














I&O- Last 24 Hours up to 6 AM 


 


 2/8/19





 06:00


 


Intake Total 320 ml


 


Output Total 0 ml


 


Balance 320 ml

















NÉSTOR ESTRADA MD                  Feb 8, 2019 15:56

## 2019-02-09 VITALS — SYSTOLIC BLOOD PRESSURE: 126 MMHG | DIASTOLIC BLOOD PRESSURE: 58 MMHG

## 2019-02-09 VITALS — DIASTOLIC BLOOD PRESSURE: 58 MMHG | SYSTOLIC BLOOD PRESSURE: 129 MMHG

## 2019-02-09 VITALS — DIASTOLIC BLOOD PRESSURE: 60 MMHG | SYSTOLIC BLOOD PRESSURE: 129 MMHG

## 2019-02-09 LAB
BUN SERPL-MCNC: 21 MG/DL (ref 7–18)
CALCIUM SERPL-MCNC: 8.7 MG/DL (ref 8.8–10.2)
CHLORIDE SERPL-SCNC: 103 MEQ/L (ref 98–107)
CO2 SERPL-SCNC: 27 MEQ/L (ref 21–32)
CREAT SERPL-MCNC: 0.8 MG/DL (ref 0.55–1.3)
GFR SERPL CREATININE-BSD FRML MDRD: > 60 ML/MIN/{1.73_M2} (ref 32–?)
GLUCOSE SERPL-MCNC: 177 MG/DL (ref 70–100)
HCT VFR BLD AUTO: 36.6 % (ref 36–47)
HGB BLD-MCNC: 12.1 G/DL (ref 12–15.5)
MAGNESIUM SERPL-MCNC: 1.8 MG/DL (ref 1.8–2.4)
MCH RBC QN AUTO: 31.1 PG (ref 27–33)
MCHC RBC AUTO-ENTMCNC: 33.1 G/DL (ref 32–36.5)
MCV RBC AUTO: 94.1 FL (ref 80–96)
PLATELET # BLD AUTO: 193 10^3/UL (ref 150–450)
POTASSIUM SERPL-SCNC: 4.1 MEQ/L (ref 3.5–5.1)
RBC # BLD AUTO: 3.89 10^6/UL (ref 4–5.4)
SODIUM SERPL-SCNC: 137 MEQ/L (ref 136–145)
WBC # BLD AUTO: 8.6 10^3/UL (ref 4–10)

## 2019-02-09 RX ADMIN — SODIUM CHLORIDE SCH ML: 9 INJECTION, SOLUTION INTRAMUSCULAR; INTRAVENOUS; SUBCUTANEOUS at 18:43

## 2019-02-09 RX ADMIN — SODIUM CHLORIDE SCH UNITS: 4.5 INJECTION, SOLUTION INTRAVENOUS at 08:42

## 2019-02-09 RX ADMIN — Medication SCH UNITS: at 05:28

## 2019-02-09 RX ADMIN — NYSTATIN SCH DOSE: 100000 POWDER TOPICAL at 20:59

## 2019-02-09 RX ADMIN — ACETAMINOPHEN PRN MG: 325 TABLET ORAL at 13:05

## 2019-02-09 RX ADMIN — POLYETHYLENE GLYCOL 3350 SCH PKT: 17 POWDER, FOR SOLUTION ORAL at 20:56

## 2019-02-09 RX ADMIN — NYSTATIN SCH ML: 100000 SUSPENSION ORAL at 22:56

## 2019-02-09 RX ADMIN — INSULIN LISPRO SCH UNITS: 100 INJECTION, SOLUTION INTRAVENOUS; SUBCUTANEOUS at 07:30

## 2019-02-09 RX ADMIN — ALLOPURINOL SCH MG: 300 TABLET ORAL at 08:41

## 2019-02-09 RX ADMIN — POLYETHYLENE GLYCOL 3350 SCH PKT: 17 POWDER, FOR SOLUTION ORAL at 08:41

## 2019-02-09 RX ADMIN — ONDANSETRON PRN MG: 2 INJECTION INTRAMUSCULAR; INTRAVENOUS at 14:26

## 2019-02-09 RX ADMIN — LOSARTAN POTASSIUM SCH MG: 50 TABLET, FILM COATED ORAL at 08:39

## 2019-02-09 RX ADMIN — NYSTATIN SCH DOSE: 100000 POWDER TOPICAL at 08:42

## 2019-02-09 RX ADMIN — PANTOPRAZOLE SODIUM SCH MG: 40 TABLET, DELAYED RELEASE ORAL at 08:41

## 2019-02-09 RX ADMIN — DOCUSATE SODIUM,SENNOSIDES SCH TAB: 50; 8.6 TABLET, FILM COATED ORAL at 20:56

## 2019-02-09 RX ADMIN — INSULIN LISPRO SCH UNITS: 100 INJECTION, SOLUTION INTRAVENOUS; SUBCUTANEOUS at 12:00

## 2019-02-09 RX ADMIN — SODIUM CHLORIDE SCH ML: 9 INJECTION, SOLUTION INTRAMUSCULAR; INTRAVENOUS; SUBCUTANEOUS at 05:27

## 2019-02-09 RX ADMIN — ACETAMINOPHEN PRN MG: 325 TABLET ORAL at 20:57

## 2019-02-09 RX ADMIN — INSULIN LISPRO SCH UNITS: 100 INJECTION, SOLUTION INTRAVENOUS; SUBCUTANEOUS at 17:30

## 2019-02-09 RX ADMIN — INSULIN LISPRO SCH UNITS: 100 INJECTION, SOLUTION INTRAVENOUS; SUBCUTANEOUS at 20:58

## 2019-02-09 RX ADMIN — CLOPIDOGREL BISULFATE SCH MG: 75 TABLET ORAL at 08:41

## 2019-02-09 RX ADMIN — SODIUM CHLORIDE SCH UNITS: 4.5 INJECTION, SOLUTION INTRAVENOUS at 20:58

## 2019-02-09 RX ADMIN — ONDANSETRON PRN MG: 2 INJECTION INTRAMUSCULAR; INTRAVENOUS at 08:45

## 2019-02-09 RX ADMIN — Medication SCH UNITS: at 18:43

## 2019-02-09 RX ADMIN — ASPIRIN SCH MG: 81 TABLET ORAL at 08:41

## 2019-02-09 RX ADMIN — DOCUSATE SODIUM,SENNOSIDES SCH TAB: 50; 8.6 TABLET, FILM COATED ORAL at 08:41

## 2019-02-09 RX ADMIN — ATORVASTATIN CALCIUM SCH MG: 20 TABLET, FILM COATED ORAL at 08:41

## 2019-02-09 NOTE — IPNPDOC
Text Note


Date of Service


The patient was seen on 2/9/19.





NOTE


Subjective: Pt feels well this am. More alert. Denies any complaints. 





Objective: 


Vitals: (see below) 


General: No acute distress, laying comfortably in bed.


HEENT: Moist mucous membranes.


Neck: No JVD or lymphadenopathy


Cardiac: RRR, No murmurs


Pulm: Clear to auscultation b/l. No wheezing, rhonchi


Abd: NT/ND + BS


Ext: No edema or cyanosis. Not cooperating with full neurologic exam. Does have 

a right facial droop, hemianopsia. Her left upper extremity is limited due to el

bow pain.








Labs (see below) 





Assessment/Plan


1. Acute left posterior cerebral artery CVA. Appreciate neurology input, with 

recommendations for aspirin, Plavix. Patient does have carotid stenosis, 75-80% 

on the right ICA, 50 present on the left. Vascular surgery has been consulted 

with options of stenting versus carotid endarterectomy. Patient had refused 

surgery. 


2. Protein calorie malnutrition- patient's diet has not been progressed. Would 

not have wanted a feeding tube. Will encourage by mouth intake. Calorie count. 

Ensure


3. Diabetes mellitus. Continue insulin regimen.


4. Hyperlipidemia on statin


5. UTI- status post Levaquin


6. Deconditioning, with max assist with PT. Progressive decline.


7. Hypertension controlled continue current meds


8. History of Bell's palsy


9. Chronic left arm and elbow pain. Pain management consulted. Percocet 

discontinued as patient was getting lethargic with opiates. K pad, patch. 





DVT prophy: Heparin subcutaneous





The patient's prognosis is guarded. 





Family aware the patient will likely need placement. They are aware of the 

guarded prognosis.





VS,Fishbone, I+O


VS, Fishbone, I+O


Laboratory Tests


2/9/19 05:52








Red Blood Count 3.89 L, Mean Corpuscular Volume 94.1, Mean Corpuscular 

Hemoglobin 31.1, Mean Corpuscular Hemoglobin Concent 33.1, Red Cell Distribution

Width 13.5, Calcium Level 8.7 L








Vital Signs








  Date Time  Temp Pulse Resp B/P (MAP) Pulse Ox O2 Delivery O2 Flow Rate FiO2


 


2/9/19 08:39    128/60    


 


2/9/19 08:39  75      


 


2/9/19 06:00 98.9  18  96   














I&O- Last 24 Hours up to 6 AM 


 


 2/9/19





 06:00


 


Intake Total 100 ml


 


Output Total 0 ml


 


Balance 100 ml

















NÉSTOR ESTRADA MD                  Feb 9, 2019 11:42

## 2019-02-10 VITALS — DIASTOLIC BLOOD PRESSURE: 58 MMHG | SYSTOLIC BLOOD PRESSURE: 118 MMHG

## 2019-02-10 VITALS — SYSTOLIC BLOOD PRESSURE: 130 MMHG | DIASTOLIC BLOOD PRESSURE: 61 MMHG

## 2019-02-10 VITALS — DIASTOLIC BLOOD PRESSURE: 51 MMHG | SYSTOLIC BLOOD PRESSURE: 106 MMHG

## 2019-02-10 LAB
BUN SERPL-MCNC: 24 MG/DL (ref 7–18)
CALCIUM SERPL-MCNC: 8.7 MG/DL (ref 8.8–10.2)
CHLORIDE SERPL-SCNC: 103 MEQ/L (ref 98–107)
CO2 SERPL-SCNC: 27 MEQ/L (ref 21–32)
CREAT SERPL-MCNC: 0.81 MG/DL (ref 0.55–1.3)
GFR SERPL CREATININE-BSD FRML MDRD: > 60 ML/MIN/{1.73_M2} (ref 32–?)
GLUCOSE SERPL-MCNC: 170 MG/DL (ref 70–100)
HCT VFR BLD AUTO: 36.1 % (ref 36–47)
HGB BLD-MCNC: 12 G/DL (ref 12–15.5)
MAGNESIUM SERPL-MCNC: 2.1 MG/DL (ref 1.8–2.4)
MCH RBC QN AUTO: 30.9 PG (ref 27–33)
MCHC RBC AUTO-ENTMCNC: 33.2 G/DL (ref 32–36.5)
MCV RBC AUTO: 93 FL (ref 80–96)
PLATELET # BLD AUTO: 179 10^3/UL (ref 150–450)
POTASSIUM SERPL-SCNC: 3.9 MEQ/L (ref 3.5–5.1)
RBC # BLD AUTO: 3.88 10^6/UL (ref 4–5.4)
SODIUM SERPL-SCNC: 136 MEQ/L (ref 136–145)
WBC # BLD AUTO: 6.9 10^3/UL (ref 4–10)

## 2019-02-10 RX ADMIN — INSULIN LISPRO SCH UNITS: 100 INJECTION, SOLUTION INTRAVENOUS; SUBCUTANEOUS at 21:00

## 2019-02-10 RX ADMIN — ACETAMINOPHEN PRN MG: 325 TABLET ORAL at 09:36

## 2019-02-10 RX ADMIN — NYSTATIN SCH ML: 100000 SUSPENSION ORAL at 09:29

## 2019-02-10 RX ADMIN — CLOPIDOGREL BISULFATE SCH MG: 75 TABLET ORAL at 09:29

## 2019-02-10 RX ADMIN — ACETAMINOPHEN PRN MG: 325 TABLET ORAL at 22:18

## 2019-02-10 RX ADMIN — Medication SCH UNITS: at 18:16

## 2019-02-10 RX ADMIN — INSULIN LISPRO SCH UNITS: 100 INJECTION, SOLUTION INTRAVENOUS; SUBCUTANEOUS at 18:16

## 2019-02-10 RX ADMIN — NYSTATIN SCH DOSE: 100000 POWDER TOPICAL at 09:34

## 2019-02-10 RX ADMIN — MULTIPLE VITAMINS W/ MINERALS TAB SCH TAB: TAB at 09:33

## 2019-02-10 RX ADMIN — NYSTATIN SCH ML: 100000 SUSPENSION ORAL at 21:15

## 2019-02-10 RX ADMIN — LOSARTAN POTASSIUM SCH MG: 50 TABLET, FILM COATED ORAL at 09:34

## 2019-02-10 RX ADMIN — NYSTATIN SCH DOSE: 100000 POWDER TOPICAL at 21:15

## 2019-02-10 RX ADMIN — INSULIN LISPRO SCH UNITS: 100 INJECTION, SOLUTION INTRAVENOUS; SUBCUTANEOUS at 13:26

## 2019-02-10 RX ADMIN — POLYETHYLENE GLYCOL 3350 SCH PKT: 17 POWDER, FOR SOLUTION ORAL at 09:37

## 2019-02-10 RX ADMIN — ALLOPURINOL SCH MG: 300 TABLET ORAL at 09:29

## 2019-02-10 RX ADMIN — ACETAMINOPHEN PRN MG: 325 TABLET ORAL at 15:27

## 2019-02-10 RX ADMIN — Medication SCH UNITS: at 05:08

## 2019-02-10 RX ADMIN — POLYETHYLENE GLYCOL 3350 SCH PKT: 17 POWDER, FOR SOLUTION ORAL at 21:15

## 2019-02-10 RX ADMIN — PANTOPRAZOLE SODIUM SCH MG: 40 TABLET, DELAYED RELEASE ORAL at 09:34

## 2019-02-10 RX ADMIN — SODIUM CHLORIDE SCH ML: 9 INJECTION, SOLUTION INTRAMUSCULAR; INTRAVENOUS; SUBCUTANEOUS at 05:08

## 2019-02-10 RX ADMIN — SODIUM CHLORIDE SCH UNITS: 4.5 INJECTION, SOLUTION INTRAVENOUS at 21:16

## 2019-02-10 RX ADMIN — SODIUM CHLORIDE SCH ML: 9 INJECTION, SOLUTION INTRAMUSCULAR; INTRAVENOUS; SUBCUTANEOUS at 18:16

## 2019-02-10 RX ADMIN — ASPIRIN SCH MG: 81 TABLET ORAL at 09:30

## 2019-02-10 RX ADMIN — ATORVASTATIN CALCIUM SCH MG: 20 TABLET, FILM COATED ORAL at 09:30

## 2019-02-10 RX ADMIN — DOCUSATE SODIUM,SENNOSIDES SCH TAB: 50; 8.6 TABLET, FILM COATED ORAL at 21:15

## 2019-02-10 RX ADMIN — SODIUM CHLORIDE SCH UNITS: 4.5 INJECTION, SOLUTION INTRAVENOUS at 09:29

## 2019-02-10 RX ADMIN — INSULIN LISPRO SCH UNITS: 100 INJECTION, SOLUTION INTRAVENOUS; SUBCUTANEOUS at 09:29

## 2019-02-10 RX ADMIN — NYSTATIN SCH ML: 100000 SUSPENSION ORAL at 15:27

## 2019-02-10 RX ADMIN — DOCUSATE SODIUM,SENNOSIDES SCH TAB: 50; 8.6 TABLET, FILM COATED ORAL at 09:30

## 2019-02-10 NOTE — IPNPDOC
Text Note


Date of Service


The patient was seen on 2/10/19.





NOTE


Subjective: No acute changes overnight. No complaints. 





Objective: 


Vitals: (see below) 


General: No acute distress, laying comfortably in bed.


HEENT: Moist mucous membranes.


Neck: No JVD or lymphadenopathy


Cardiac: RRR, No murmurs


Pulm: Clear to auscultation b/l. No wheezing, rhonchi


Abd: NT/ND + BS


Ext: No edema or cyanosis. Not cooperating with full neurologic exam. Does have 

a right facial droop, hemianopsia. Her left upper extremity is limited due to 

elbow pain.








Labs (see below) 





Assessment/Plan


1. Acute left posterior cerebral artery CVA. Appreciate neurology input, with 

recommendations for aspirin, Plavix. Patient does have carotid stenosis, 75-80% 

on the right ICA, 50 present on the left. Vascular surgery has been consulted 

with options of stenting versus carotid endarterectomy. Patient had refused 

surgery. 


2. Protein calorie malnutrition- PO encouraged. Family/pt do not want feeding 

tube at this time. Calorie count. Ensure. Family notes they will bring in 

outside food which she prefers. 


3. Diabetes mellitus. Continue insulin regimen.


4. Hyperlipidemia on statin


5. UTI- status post Levaquin


6. Deconditioning, with max assist with PT. Progressive decline.


7. Hypertension controlled continue current meds


8. History of Bell's palsy


9. Chronic left arm and elbow pain. Pain management consulted. Percocet 

discontinued as patient was getting lethargic with opiates. K pad, patch. 





DVT prophy: Heparin subcutaneous





The patient's prognosis is guarded. 





Family aware the patient will likely need placement. They are aware of the 

guarded prognosis.





VS,Fishbone, I+O


VS, Fishbone, I+O


Laboratory Tests


2/10/19 05:44








Red Blood Count 3.88 L, Mean Corpuscular Volume 93.0, Mean Corpuscular 

Hemoglobin 30.9, Mean Corpuscular Hemoglobin Concent 33.2, Red Cell Distribution

Width 13.6, Calcium Level 8.7 L








Vital Signs








  Date Time  Temp Pulse Resp B/P (MAP) Pulse Ox O2 Delivery O2 Flow Rate FiO2


 


2/10/19 09:33  66  152/67    


 


2/10/19 06:00 97.0  18  96   














I&O- Last 24 Hours up to 6 AM 


 


 2/10/19





 06:00


 


Intake Total 300 ml


 


Output Total 0 ml


 


Balance 300 ml

















NÉSTOR ESTRADA MD                 Feb 10, 2019 10:39

## 2019-02-11 VITALS — SYSTOLIC BLOOD PRESSURE: 115 MMHG | DIASTOLIC BLOOD PRESSURE: 45 MMHG

## 2019-02-11 VITALS — DIASTOLIC BLOOD PRESSURE: 80 MMHG | SYSTOLIC BLOOD PRESSURE: 129 MMHG

## 2019-02-11 LAB
BUN SERPL-MCNC: 26 MG/DL (ref 7–18)
CALCIUM SERPL-MCNC: 8.9 MG/DL (ref 8.8–10.2)
CHLORIDE SERPL-SCNC: 104 MEQ/L (ref 98–107)
CO2 SERPL-SCNC: 26 MEQ/L (ref 21–32)
CREAT SERPL-MCNC: 1.07 MG/DL (ref 0.55–1.3)
GFR SERPL CREATININE-BSD FRML MDRD: 52.3 ML/MIN/{1.73_M2} (ref 32–?)
GLUCOSE SERPL-MCNC: 187 MG/DL (ref 70–100)
HCT VFR BLD AUTO: 40 % (ref 36–47)
HGB BLD-MCNC: 12.9 G/DL (ref 12–15.5)
MAGNESIUM SERPL-MCNC: 1.9 MG/DL (ref 1.8–2.4)
MCH RBC QN AUTO: 30.7 PG (ref 27–33)
MCHC RBC AUTO-ENTMCNC: 32.3 G/DL (ref 32–36.5)
MCV RBC AUTO: 95.2 FL (ref 80–96)
PLATELET # BLD AUTO: 203 10^3/UL (ref 150–450)
POTASSIUM SERPL-SCNC: 4 MEQ/L (ref 3.5–5.1)
RBC # BLD AUTO: 4.2 10^6/UL (ref 4–5.4)
SODIUM SERPL-SCNC: 138 MEQ/L (ref 136–145)
WBC # BLD AUTO: 9.5 10^3/UL (ref 4–10)

## 2019-02-11 RX ADMIN — NYSTATIN SCH DOSE: 100000 POWDER TOPICAL at 08:30

## 2019-02-11 RX ADMIN — LOSARTAN POTASSIUM SCH MG: 50 TABLET, FILM COATED ORAL at 08:30

## 2019-02-11 RX ADMIN — NYSTATIN SCH DOSE: 100000 POWDER TOPICAL at 21:32

## 2019-02-11 RX ADMIN — SODIUM CHLORIDE SCH ML: 9 INJECTION, SOLUTION INTRAMUSCULAR; INTRAVENOUS; SUBCUTANEOUS at 05:00

## 2019-02-11 RX ADMIN — INSULIN LISPRO SCH UNITS: 100 INJECTION, SOLUTION INTRAVENOUS; SUBCUTANEOUS at 17:49

## 2019-02-11 RX ADMIN — CLOPIDOGREL BISULFATE SCH MG: 75 TABLET ORAL at 08:28

## 2019-02-11 RX ADMIN — DOCUSATE SODIUM,SENNOSIDES SCH TAB: 50; 8.6 TABLET, FILM COATED ORAL at 21:33

## 2019-02-11 RX ADMIN — PANTOPRAZOLE SODIUM SCH MG: 40 TABLET, DELAYED RELEASE ORAL at 08:29

## 2019-02-11 RX ADMIN — POLYETHYLENE GLYCOL 3350 SCH PKT: 17 POWDER, FOR SOLUTION ORAL at 21:34

## 2019-02-11 RX ADMIN — ACETAMINOPHEN PRN MG: 325 TABLET ORAL at 05:00

## 2019-02-11 RX ADMIN — Medication SCH UNITS: at 17:49

## 2019-02-11 RX ADMIN — DOCUSATE SODIUM,SENNOSIDES SCH TAB: 50; 8.6 TABLET, FILM COATED ORAL at 08:28

## 2019-02-11 RX ADMIN — ASPIRIN SCH MG: 81 TABLET ORAL at 08:31

## 2019-02-11 RX ADMIN — ATORVASTATIN CALCIUM SCH MG: 20 TABLET, FILM COATED ORAL at 08:29

## 2019-02-11 RX ADMIN — ACETAMINOPHEN PRN MG: 325 TABLET ORAL at 17:49

## 2019-02-11 RX ADMIN — SODIUM CHLORIDE SCH ML: 9 INJECTION, SOLUTION INTRAMUSCULAR; INTRAVENOUS; SUBCUTANEOUS at 17:49

## 2019-02-11 RX ADMIN — ALLOPURINOL SCH MG: 300 TABLET ORAL at 08:29

## 2019-02-11 RX ADMIN — INSULIN LISPRO SCH UNITS: 100 INJECTION, SOLUTION INTRAVENOUS; SUBCUTANEOUS at 21:00

## 2019-02-11 RX ADMIN — MULTIPLE VITAMINS W/ MINERALS TAB SCH TAB: TAB at 08:29

## 2019-02-11 RX ADMIN — INSULIN LISPRO SCH UNITS: 100 INJECTION, SOLUTION INTRAVENOUS; SUBCUTANEOUS at 08:28

## 2019-02-11 RX ADMIN — NYSTATIN SCH ML: 100000 SUSPENSION ORAL at 08:29

## 2019-02-11 RX ADMIN — NYSTATIN SCH ML: 100000 SUSPENSION ORAL at 16:52

## 2019-02-11 RX ADMIN — SODIUM CHLORIDE SCH UNITS: 4.5 INJECTION, SOLUTION INTRAVENOUS at 21:33

## 2019-02-11 RX ADMIN — INSULIN LISPRO SCH UNITS: 100 INJECTION, SOLUTION INTRAVENOUS; SUBCUTANEOUS at 12:20

## 2019-02-11 RX ADMIN — ACETAMINOPHEN PRN MG: 325 TABLET ORAL at 23:37

## 2019-02-11 RX ADMIN — NYSTATIN SCH ML: 100000 SUSPENSION ORAL at 21:34

## 2019-02-11 RX ADMIN — SODIUM CHLORIDE SCH UNITS: 4.5 INJECTION, SOLUTION INTRAVENOUS at 08:30

## 2019-02-11 RX ADMIN — POLYETHYLENE GLYCOL 3350 SCH PKT: 17 POWDER, FOR SOLUTION ORAL at 08:28

## 2019-02-11 RX ADMIN — Medication SCH UNITS: at 05:00

## 2019-02-11 NOTE — IPNPDOC
Text Note


Date of Service


The patient was seen on 2/11/19.





NOTE


Subjective: Denies any complaints. Ate breakfast. No acute changes overnight. 





Objective: 


Vitals: (see below) 


General: No acute distress, laying comfortably in bed.


HEENT: Moist mucous membranes.


Neck: No JVD or lymphadenopathy


Cardiac: RRR, No murmurs


Pulm: Clear to auscultation b/l. No wheezing, rhonchi


Abd: NT/ND + BS


Ext: No edema or cyanosis. Not cooperating with full neurologic exam. Does have 

a right facial droop, hemianopsia. Her left upper extremity is limited due to 

elbow pain.








Labs (see below) 





Assessment/Plan


1. Acute left posterior cerebral artery CVA. Appreciate neurology input, with 

recommendations for aspirin, Plavix. Patient does have carotid stenosis, 75-80% 

on the right ICA, 50 present on the left. Vascular surgery has been consulted 

with options of stenting versus carotid endarterectomy. Patient had refused 

surgery. 


2. Protein calorie malnutrition- PO encouraged. Family/pt do not want feeding 

tube at this time. Calorie count. Ensure. Family notes they will bring in 

outside food which she prefers. 


3. Diabetes mellitus. Continue insulin regimen.


4. Hyperlipidemia on statin


5. UTI- status post Levaquin


6. Deconditioning, with max assist with PT. Progressive decline.


7. Hypertension controlled continue current meds


8. History of Bell's palsy


9. Chronic left arm and elbow pain. Pain has improved.  Percocet discontinued as

patient was getting lethargic with opiates. K pad, patch. 





DVT prophy: Heparin subcutaneous





The patient's prognosis is guarded. 





Family aware the patient will likely need placement. They are aware of the gua

rded prognosis.





CM working on placement.





VS,Fishbone, I+O


VS, Fishbone, I+O


Laboratory Tests


2/11/19 05:23








Red Blood Count 4.20, Mean Corpuscular Volume 95.2, Mean Corpuscular Hemoglobin 

30.7, Mean Corpuscular Hemoglobin Concent 32.3, Red Cell Distribution Width 

13.7, Calcium Level 8.9








Vital Signs








  Date Time  Temp Pulse Resp B/P (MAP) Pulse Ox O2 Delivery O2 Flow Rate FiO2


 


2/11/19 08:30    135/63    


 


2/11/19 06:00 98.4 90 18  95   














I&O- Last 24 Hours up to 6 AM 


 


 2/11/19





 06:00


 


Intake Total 240 ml


 


Output Total 100 ml


 


Balance 140 ml

















NÉSTOR ESTRADA MD                 Feb 11, 2019 14:19

## 2019-02-12 VITALS — SYSTOLIC BLOOD PRESSURE: 143 MMHG | DIASTOLIC BLOOD PRESSURE: 78 MMHG

## 2019-02-12 VITALS — DIASTOLIC BLOOD PRESSURE: 63 MMHG | SYSTOLIC BLOOD PRESSURE: 123 MMHG

## 2019-02-12 VITALS — DIASTOLIC BLOOD PRESSURE: 80 MMHG | SYSTOLIC BLOOD PRESSURE: 136 MMHG

## 2019-02-12 LAB
BUN SERPL-MCNC: 29 MG/DL (ref 7–18)
CALCIUM SERPL-MCNC: 8.9 MG/DL (ref 8.8–10.2)
CHLORIDE SERPL-SCNC: 104 MEQ/L (ref 98–107)
CO2 SERPL-SCNC: 27 MEQ/L (ref 21–32)
CREAT SERPL-MCNC: 1.01 MG/DL (ref 0.55–1.3)
GFR SERPL CREATININE-BSD FRML MDRD: 55.9 ML/MIN/{1.73_M2} (ref 32–?)
GLUCOSE SERPL-MCNC: 161 MG/DL (ref 70–100)
HCT VFR BLD AUTO: 38.1 % (ref 36–47)
HGB BLD-MCNC: 12.6 G/DL (ref 12–15.5)
MAGNESIUM SERPL-MCNC: 2 MG/DL (ref 1.8–2.4)
MCH RBC QN AUTO: 32 PG (ref 27–33)
MCHC RBC AUTO-ENTMCNC: 33.1 G/DL (ref 32–36.5)
MCV RBC AUTO: 96.7 FL (ref 80–96)
PLATELET # BLD AUTO: 189 10^3/UL (ref 150–450)
POTASSIUM SERPL-SCNC: 4.4 MEQ/L (ref 3.5–5.1)
RBC # BLD AUTO: 3.94 10^6/UL (ref 4–5.4)
SODIUM SERPL-SCNC: 138 MEQ/L (ref 136–145)
WBC # BLD AUTO: 6.4 10^3/UL (ref 4–10)

## 2019-02-12 RX ADMIN — ACETAMINOPHEN PRN MG: 325 TABLET ORAL at 12:31

## 2019-02-12 RX ADMIN — SODIUM CHLORIDE SCH UNITS: 4.5 INJECTION, SOLUTION INTRAVENOUS at 20:47

## 2019-02-12 RX ADMIN — SODIUM CHLORIDE SCH UNITS: 4.5 INJECTION, SOLUTION INTRAVENOUS at 08:51

## 2019-02-12 RX ADMIN — POLYETHYLENE GLYCOL 3350 SCH PKT: 17 POWDER, FOR SOLUTION ORAL at 08:53

## 2019-02-12 RX ADMIN — NYSTATIN SCH DOSE: 100000 POWDER TOPICAL at 20:48

## 2019-02-12 RX ADMIN — NYSTATIN SCH DOSE: 100000 POWDER TOPICAL at 08:53

## 2019-02-12 RX ADMIN — SODIUM CHLORIDE SCH ML: 9 INJECTION, SOLUTION INTRAMUSCULAR; INTRAVENOUS; SUBCUTANEOUS at 17:54

## 2019-02-12 RX ADMIN — NYSTATIN SCH ML: 100000 SUSPENSION ORAL at 08:51

## 2019-02-12 RX ADMIN — ATORVASTATIN CALCIUM SCH MG: 20 TABLET, FILM COATED ORAL at 08:51

## 2019-02-12 RX ADMIN — SODIUM CHLORIDE SCH ML: 9 INJECTION, SOLUTION INTRAMUSCULAR; INTRAVENOUS; SUBCUTANEOUS at 05:05

## 2019-02-12 RX ADMIN — Medication SCH UNITS: at 17:54

## 2019-02-12 RX ADMIN — PANTOPRAZOLE SODIUM SCH MG: 40 TABLET, DELAYED RELEASE ORAL at 08:51

## 2019-02-12 RX ADMIN — POLYETHYLENE GLYCOL 3350 SCH PKT: 17 POWDER, FOR SOLUTION ORAL at 20:48

## 2019-02-12 RX ADMIN — ASPIRIN SCH MG: 81 TABLET ORAL at 08:52

## 2019-02-12 RX ADMIN — DOCUSATE SODIUM,SENNOSIDES SCH TAB: 50; 8.6 TABLET, FILM COATED ORAL at 08:51

## 2019-02-12 RX ADMIN — CLOPIDOGREL BISULFATE SCH MG: 75 TABLET ORAL at 08:51

## 2019-02-12 RX ADMIN — NYSTATIN SCH ML: 100000 SUSPENSION ORAL at 16:16

## 2019-02-12 RX ADMIN — DOCUSATE SODIUM,SENNOSIDES SCH TAB: 50; 8.6 TABLET, FILM COATED ORAL at 20:48

## 2019-02-12 RX ADMIN — INSULIN LISPRO SCH UNITS: 100 INJECTION, SOLUTION INTRAVENOUS; SUBCUTANEOUS at 08:50

## 2019-02-12 RX ADMIN — INSULIN LISPRO SCH UNITS: 100 INJECTION, SOLUTION INTRAVENOUS; SUBCUTANEOUS at 17:54

## 2019-02-12 RX ADMIN — Medication SCH UNITS: at 05:05

## 2019-02-12 RX ADMIN — LOSARTAN POTASSIUM SCH MG: 50 TABLET, FILM COATED ORAL at 08:53

## 2019-02-12 RX ADMIN — INSULIN LISPRO SCH UNITS: 100 INJECTION, SOLUTION INTRAVENOUS; SUBCUTANEOUS at 12:32

## 2019-02-12 RX ADMIN — ACETAMINOPHEN PRN MG: 325 TABLET ORAL at 05:06

## 2019-02-12 RX ADMIN — ACETAMINOPHEN PRN MG: 325 TABLET ORAL at 17:55

## 2019-02-12 RX ADMIN — NYSTATIN SCH ML: 100000 SUSPENSION ORAL at 20:48

## 2019-02-12 RX ADMIN — INSULIN LISPRO SCH UNITS: 100 INJECTION, SOLUTION INTRAVENOUS; SUBCUTANEOUS at 20:49

## 2019-02-12 RX ADMIN — MULTIPLE VITAMINS W/ MINERALS TAB SCH TAB: TAB at 08:51

## 2019-02-12 RX ADMIN — ALLOPURINOL SCH MG: 300 TABLET ORAL at 08:51

## 2019-02-12 NOTE — IPN
DATE:   02/12/2019

 

SUBJECTIVE:   THe patient is seen and examined.  The patient is minimally verbal,

poor historian.  Reported back pain and elbow pain.  Denies any chest pain.

 

VITAL SIGNS:  Temperature 97.8, pulse 75, respirations 16, blood pressure 123/63,

pulse oximetry 95% on room air.

 

LABORATORY DATA:

WBC 6.4, hemoglobin and hematocrit 12.6/38.1, platelets 189.

 

Chemistry:  Sodium 138, potassium 4.4, chloride 104, bicarbonate 27, BUN 29.

 

PHYSICAL EXAMINATION:

GENERAL:  The patient is in no acute distress.  Comfortable.

HEENT:  Moist mucous membranes.  Neck is supple.

CARDIAC:  Regular S1, S2.

PULMONARY:  Bilaterally clear.

ABDOMEN:  Soft, obese.

EXTREMITIES:  No edema.

NEUROLOGIC:  Right sided facial droop.  Left upper extremity motion limited due

to pain.  Right semi hemianopsia.

 

ASSESSMENT AND PLAN:   This is an 83-year-old female patient with underlying

medical history of chronic left elbow pain, hypertension, dyslipidemia, diabetes

mellitus, gastroesophageal reflux disease (GERD), gout, history of Bell's palsy,

right sided facial paralysis with history of prior stroke, presented initially

with left upper extremity pain that is worsened and found to have right posterior

cerebral artery stroke with left  upper extremity weakness.

 

1.  Acute left posterior cerebral artery CVA, neurology consulted.  Currently on

aspirin and Plavix.  The patient does have carotid artery stenosis of 75 to 80%

on the right internal carotid artery, 50% on the left.  Vascular surgery has been

consulted, possible stenting versus carotid endarterectomy has been discussed.

The patient initially refused surgery.  Currently, the patient is bed bound.

 

2.  Protein calorie malnutrition.  Encouraged oral.  Family has refused feeding

tube.  Calorie count.  Ensure supplementation.

 

3.  Diabetes mellitus.  Insulin as ordered.

 

4.  Dyslipidemia.  Continue statin.

 

5.  Urinary tract infection (UTI), treated.

 

6.  Deconditioning.  Progressive decline.  Physical therapy (PT), max assist,

likely will need nursing home placement.

 

7.  Hypertension.  Continue aspirin, Coreg, Plavix, statin, losartan.  Monitor

blood pressure.

 

8.  History of Bell's palsy.  Supportive care.

 

9.  Chronic left arm and elbow pain.  Avoid narcotics since the patient has been

lethargic.

 

10.  Deep vein thrombosis (DVT) prophylaxis.  Heparin subcutaneously.

 

DISPOSITION:  Poor long-term prognosis.  Likely will need placement.

## 2019-02-13 VITALS — SYSTOLIC BLOOD PRESSURE: 125 MMHG | DIASTOLIC BLOOD PRESSURE: 58 MMHG

## 2019-02-13 VITALS — SYSTOLIC BLOOD PRESSURE: 183 MMHG | DIASTOLIC BLOOD PRESSURE: 82 MMHG

## 2019-02-13 VITALS — SYSTOLIC BLOOD PRESSURE: 142 MMHG | DIASTOLIC BLOOD PRESSURE: 66 MMHG

## 2019-02-13 LAB
BUN SERPL-MCNC: 31 MG/DL (ref 7–18)
CALCIUM SERPL-MCNC: 8.8 MG/DL (ref 8.8–10.2)
CHLORIDE SERPL-SCNC: 105 MEQ/L (ref 98–107)
CO2 SERPL-SCNC: 27 MEQ/L (ref 21–32)
CREAT SERPL-MCNC: 0.93 MG/DL (ref 0.55–1.3)
GFR SERPL CREATININE-BSD FRML MDRD: > 60 ML/MIN/{1.73_M2} (ref 32–?)
GLUCOSE SERPL-MCNC: 180 MG/DL (ref 70–100)
HCT VFR BLD AUTO: 39.5 % (ref 36–47)
HGB BLD-MCNC: 12.8 G/DL (ref 12–15.5)
MAGNESIUM SERPL-MCNC: 1.9 MG/DL (ref 1.8–2.4)
MCH RBC QN AUTO: 31.3 PG (ref 27–33)
MCHC RBC AUTO-ENTMCNC: 32.4 G/DL (ref 32–36.5)
MCV RBC AUTO: 96.6 FL (ref 80–96)
PLATELET # BLD AUTO: 199 10^3/UL (ref 150–450)
POTASSIUM SERPL-SCNC: 4.1 MEQ/L (ref 3.5–5.1)
RBC # BLD AUTO: 4.09 10^6/UL (ref 4–5.4)
SODIUM SERPL-SCNC: 141 MEQ/L (ref 136–145)
WBC # BLD AUTO: 7.4 10^3/UL (ref 4–10)

## 2019-02-13 RX ADMIN — ASPIRIN SCH MG: 81 TABLET ORAL at 08:51

## 2019-02-13 RX ADMIN — MULTIPLE VITAMINS W/ MINERALS TAB SCH TAB: TAB at 08:51

## 2019-02-13 RX ADMIN — NYSTATIN SCH DOSE: 100000 POWDER TOPICAL at 22:10

## 2019-02-13 RX ADMIN — NYSTATIN SCH ML: 100000 SUSPENSION ORAL at 22:08

## 2019-02-13 RX ADMIN — ALLOPURINOL SCH MG: 300 TABLET ORAL at 08:50

## 2019-02-13 RX ADMIN — SODIUM CHLORIDE SCH UNITS: 4.5 INJECTION, SOLUTION INTRAVENOUS at 08:51

## 2019-02-13 RX ADMIN — NYSTATIN SCH ML: 100000 SUSPENSION ORAL at 16:27

## 2019-02-13 RX ADMIN — ACETAMINOPHEN PRN MG: 325 TABLET ORAL at 05:59

## 2019-02-13 RX ADMIN — ATORVASTATIN CALCIUM SCH MG: 20 TABLET, FILM COATED ORAL at 08:49

## 2019-02-13 RX ADMIN — SODIUM CHLORIDE SCH ML: 9 INJECTION, SOLUTION INTRAMUSCULAR; INTRAVENOUS; SUBCUTANEOUS at 17:34

## 2019-02-13 RX ADMIN — Medication SCH UNITS: at 05:12

## 2019-02-13 RX ADMIN — INSULIN LISPRO SCH UNITS: 100 INJECTION, SOLUTION INTRAVENOUS; SUBCUTANEOUS at 12:28

## 2019-02-13 RX ADMIN — INSULIN LISPRO SCH UNITS: 100 INJECTION, SOLUTION INTRAVENOUS; SUBCUTANEOUS at 21:00

## 2019-02-13 RX ADMIN — DOCUSATE SODIUM,SENNOSIDES SCH TAB: 50; 8.6 TABLET, FILM COATED ORAL at 22:09

## 2019-02-13 RX ADMIN — ACETAMINOPHEN PRN MG: 325 TABLET ORAL at 22:08

## 2019-02-13 RX ADMIN — PANTOPRAZOLE SODIUM SCH MG: 40 TABLET, DELAYED RELEASE ORAL at 08:49

## 2019-02-13 RX ADMIN — CLOPIDOGREL BISULFATE SCH MG: 75 TABLET ORAL at 08:49

## 2019-02-13 RX ADMIN — SODIUM CHLORIDE SCH UNITS: 4.5 INJECTION, SOLUTION INTRAVENOUS at 22:10

## 2019-02-13 RX ADMIN — DOCUSATE SODIUM,SENNOSIDES SCH TAB: 50; 8.6 TABLET, FILM COATED ORAL at 08:50

## 2019-02-13 RX ADMIN — INSULIN LISPRO SCH UNITS: 100 INJECTION, SOLUTION INTRAVENOUS; SUBCUTANEOUS at 17:33

## 2019-02-13 RX ADMIN — INSULIN LISPRO SCH UNITS: 100 INJECTION, SOLUTION INTRAVENOUS; SUBCUTANEOUS at 08:49

## 2019-02-13 RX ADMIN — LOSARTAN POTASSIUM SCH MG: 50 TABLET, FILM COATED ORAL at 08:50

## 2019-02-13 RX ADMIN — NYSTATIN SCH ML: 100000 SUSPENSION ORAL at 08:51

## 2019-02-13 RX ADMIN — SODIUM CHLORIDE SCH ML: 9 INJECTION, SOLUTION INTRAMUSCULAR; INTRAVENOUS; SUBCUTANEOUS at 05:12

## 2019-02-13 RX ADMIN — ACETAMINOPHEN PRN MG: 325 TABLET ORAL at 00:16

## 2019-02-13 RX ADMIN — POLYETHYLENE GLYCOL 3350 SCH PKT: 17 POWDER, FOR SOLUTION ORAL at 22:07

## 2019-02-13 RX ADMIN — NYSTATIN SCH DOSE: 100000 POWDER TOPICAL at 08:51

## 2019-02-13 RX ADMIN — Medication SCH UNITS: at 17:34

## 2019-02-13 RX ADMIN — POLYETHYLENE GLYCOL 3350 SCH PKT: 17 POWDER, FOR SOLUTION ORAL at 08:52

## 2019-02-13 NOTE — IPNPDOC
Text Note


Date of Service


The patient was seen on 2/13/19.





NOTE


THe patient is seen and examined.  The patient is minimally verbal,


poor historian.  Reported back pain and elbow pain.  Denies any chest pain.


 





 


PHYSICAL EXAMINATION:


GENERAL:  The patient is in no acute distress.  Comfortable.


HEENT:  Moist mucous membranes.  Neck is supple.


CARDIAC:  Regular S1, S2.


PULMONARY:  Bilaterally clear.


ABDOMEN:  Soft, obese.


EXTREMITIES:  No edema.


NEUROLOGIC:  Right sided facial droop.  Left upper extremity motion limited due


to pain.  Right semi hemianopsia.


 


ASSESSMENT AND PLAN:   This is an 83-year-old female patient with underlying


medical history of chronic left elbow pain, hypertension, dyslipidemia, diabetes


mellitus, gastroesophageal reflux disease (GERD), gout, history of Bell's palsy,


right sided facial paralysis with history of prior stroke, presented initially


with left upper extremity pain that is worsened and found to have right 

posterior


cerebral artery stroke with left  upper extremity weakness.


 


1.  Acute left posterior cerebral artery CVA, neurology consulted.  Currently on


aspirin and Plavix.  The patient does have carotid artery stenosis of 75 to 80%


on the right internal carotid artery, 50% on the left.  Vascular surgery has 

been


consulted, possible stenting versus carotid endarterectomy has been discussed.


The patient initially refused surgery.  Currently, the patient is bed bound.


 


2.  Severe Protein calorie malnutrition.  Encouraged oral.  Family has refused 

feeding


tube. risk and benefit discussed.  Calorie count.  Ensure supplementation.


 


3.  Diabetes mellitus.  Insulin as ordered.


 


4.  Dyslipidemia.  Continue statin.


 


5.  Urinary tract infection (UTI), treated.


 


6.  Deconditioning.  Progressive decline.  Physical therapy (PT), max assist,


likely will need nursing home placement.


 


7.  Hypertension.  Continue aspirin, Coreg, Plavix, statin, losartan.  Monitor


blood pressure.


 


8.  History of Bell's palsy.  Supportive care.


 


9.  Chronic left arm and elbow pain.  Avoid narcotics since the patient has been


lethargic.


 


10.  Deep vein thrombosis (DVT) prophylaxis.  Heparin subcutaneously.


 


DISPOSITION:  Poor long-term prognosis.  Likely will need placement.





VS,Fishbone, I+O


VS, Fishbone, I+O


Laboratory Tests


2/13/19 05:42








Red Blood Count 4.09, Mean Corpuscular Volume 96.6 H, Mean Corpuscular 

Hemoglobin 31.3, Mean Corpuscular Hemoglobin Concent 32.4, Red Cell Distribution

Width 13.8, Calcium Level 8.8








Vital Signs








  Date Time  Temp Pulse Resp B/P (MAP) Pulse Ox O2 Delivery O2 Flow Rate FiO2


 


2/13/19 14:00 97.0 71 18 125/58 (80) 96   


 


2/12/19 22:00       136.0 














I&O- Last 24 Hours up to 6 AM 


 


 2/13/19





 06:00


 


Intake Total 0 ml


 


Output Total 0 ml


 


Balance 0 ml

















LUCIA JAIMES MD                      Feb 13, 2019 20:49

## 2019-02-14 VITALS — SYSTOLIC BLOOD PRESSURE: 138 MMHG | DIASTOLIC BLOOD PRESSURE: 72 MMHG

## 2019-02-14 VITALS — SYSTOLIC BLOOD PRESSURE: 140 MMHG | DIASTOLIC BLOOD PRESSURE: 68 MMHG

## 2019-02-14 LAB
BUN SERPL-MCNC: 39 MG/DL (ref 7–18)
CALCIUM SERPL-MCNC: 9 MG/DL (ref 8.8–10.2)
CHLORIDE SERPL-SCNC: 106 MEQ/L (ref 98–107)
CO2 SERPL-SCNC: 27 MEQ/L (ref 21–32)
CREAT SERPL-MCNC: 0.91 MG/DL (ref 0.55–1.3)
GFR SERPL CREATININE-BSD FRML MDRD: > 60 ML/MIN/{1.73_M2} (ref 32–?)
GLUCOSE SERPL-MCNC: 176 MG/DL (ref 70–100)
HCT VFR BLD AUTO: 37 % (ref 36–47)
HGB BLD-MCNC: 12 G/DL (ref 12–15.5)
MAGNESIUM SERPL-MCNC: 2 MG/DL (ref 1.8–2.4)
MCH RBC QN AUTO: 30.8 PG (ref 27–33)
MCHC RBC AUTO-ENTMCNC: 32.4 G/DL (ref 32–36.5)
MCV RBC AUTO: 94.9 FL (ref 80–96)
PLATELET # BLD AUTO: 120 10^3/UL (ref 150–450)
POTASSIUM SERPL-SCNC: 4.1 MEQ/L (ref 3.5–5.1)
RBC # BLD AUTO: 3.9 10^6/UL (ref 4–5.4)
SODIUM SERPL-SCNC: 141 MEQ/L (ref 136–145)
WBC # BLD AUTO: 7.3 10^3/UL (ref 4–10)

## 2019-02-14 RX ADMIN — POLYETHYLENE GLYCOL 3350 SCH PKT: 17 POWDER, FOR SOLUTION ORAL at 08:23

## 2019-02-14 RX ADMIN — NYSTATIN SCH DOSE: 100000 POWDER TOPICAL at 08:23

## 2019-02-14 RX ADMIN — NYSTATIN SCH ML: 100000 SUSPENSION ORAL at 08:17

## 2019-02-14 RX ADMIN — DOCUSATE SODIUM,SENNOSIDES SCH TAB: 50; 8.6 TABLET, FILM COATED ORAL at 08:22

## 2019-02-14 RX ADMIN — PANTOPRAZOLE SODIUM SCH MG: 40 TABLET, DELAYED RELEASE ORAL at 08:22

## 2019-02-14 RX ADMIN — SODIUM CHLORIDE SCH UNITS: 4.5 INJECTION, SOLUTION INTRAVENOUS at 08:18

## 2019-02-14 RX ADMIN — ALLOPURINOL SCH MG: 300 TABLET ORAL at 08:22

## 2019-02-14 RX ADMIN — Medication SCH UNITS: at 05:07

## 2019-02-14 RX ADMIN — ASPIRIN SCH MG: 81 TABLET ORAL at 08:22

## 2019-02-14 RX ADMIN — ACETAMINOPHEN PRN MG: 325 TABLET ORAL at 05:07

## 2019-02-14 RX ADMIN — LOSARTAN POTASSIUM SCH MG: 50 TABLET, FILM COATED ORAL at 08:23

## 2019-02-14 RX ADMIN — INSULIN LISPRO SCH UNITS: 100 INJECTION, SOLUTION INTRAVENOUS; SUBCUTANEOUS at 08:19

## 2019-02-14 RX ADMIN — CLOPIDOGREL BISULFATE SCH MG: 75 TABLET ORAL at 08:22

## 2019-02-14 RX ADMIN — MULTIPLE VITAMINS W/ MINERALS TAB SCH TAB: TAB at 08:22

## 2019-02-14 RX ADMIN — INSULIN LISPRO SCH UNITS: 100 INJECTION, SOLUTION INTRAVENOUS; SUBCUTANEOUS at 12:17

## 2019-02-14 RX ADMIN — SODIUM CHLORIDE SCH ML: 9 INJECTION, SOLUTION INTRAMUSCULAR; INTRAVENOUS; SUBCUTANEOUS at 05:07

## 2019-02-14 RX ADMIN — ATORVASTATIN CALCIUM SCH MG: 20 TABLET, FILM COATED ORAL at 08:22

## 2019-02-14 NOTE — DSES
DATE OF ADMISSION:  01/27/2019

DATE OF DISCHARGE:  02/14/2019

 

PRIMARY CARE PROVIDER:  Perez Villegas Jr., MD

 

VASCULAR SURGERY:  SHEILA Mac MD

 

NEUROLOGIST:  Mohsin Ali, MD

 

PAIN MANAGEMENT:  Ritu Nichols NP

 

FINAL DIAGNOSES:  Acute left parietal cerebral artery CVA with also right

internal carotid artery stenosis 75-80%, severe protein calorie malnutrition,

diabetes mellitus, dyslipidemia, urinary tract infection (UTI), deconditioning,

hypertension, history of Bell's palsy, chronic left arm and elbow pain.

 

HISTORY OF PRESENT ILLNESS:  This is an 83-year-old female patient with

underlying medical history of hypertension, dyslipidemia, diabetes,

gastroesophageal reflux disease (GERD), gout, history of Bell's palsy,

right-sided facial paralysis due to Bell's palsy, history of prior stroke,

patient reported being in her usual state of health until some time in the

afternoon of admission when her pain in her left elbow had gotten significantly

worse, especially with movement with no relieving factor, patient described

inability to move her left upper extremity, daughter reported new unclear facial

changes and left eye of the patient is unable to be focused.  No mentioning of

headache, dizziness, syncope, near syncope.  Denies nausea, vomiting, chest pain,

shortness of breath.

 

HOSPITAL COURSE:  Patient admitted to the hospital.  Neurology is consulted.  MRI

was done showing acute stroke.  Patient is on aspirin and Plavix.  Patient also

found to have right internal carotid artery stenosis with dysphagia due to

stroke.  Vascular surgery was consulted but patient was adamant that she does not

want surgery.  Hospital course further complicated with progressive worsening

deconditioning, poor oral intake.  Speech and swallow, physical therapy (PT),

occupational therapy (OT) evaluation was done.  Patient was placed on pureed

diet.   was consulted.  Pain management was also consulted.  It was

recommended for patient to be transferred to subacute rehabilitation for further

care and physical therapy.  Subsequently,  was consulted.  Patient's

blood pressure medications were continued.  Deep venous thrombosis (DVT)

prophylaxis was provided.  Diabetes was managed with insulin during inpatient.

Currently patient is tolerating oral.  Option of feeding tube with risk and

benefit was discussed with the patient and patient's family.  Patient tolerating

oral.  Need two maximum assist for standing.  Not progressing well with physical

therapy.  Ready to be transferred to short-term rehabilitation for further care.

 

VITAL SIGNS:  Temperature 98.1, pulse 70, respirations 19, blood pressure 140/68,

pulse oximetry 96% on room air.

 

LABORATORY DATA:  WBC 7.3, hemoglobin and hematocrit 12/37, platelets 120.

Chemistry:  Sodium 141, potassium 4.1, chloride 106, bicarbonate 27, BUN 39,

creatinine 0.9.

 

PHYSICAL EXAMINATION:

GENERAL:  Patient in no acute distress, comfortable.

Moist mucous membranes.  Neck supple.

CARDIAC:  Regular, S1, S2.

PULMONARY:  Bilateral clear.

ABDOMEN:  Soft, obese.

EXTREMITIES:  No clubbing, cyanosis, or edema.

NEUROLOGIC:  Right-sided facial droop.  Left upper extremity motion limited due

to pain.  Right hemianopsia.

 

DISCHARGE MEDICATIONS:

- Peridex mouthwash twice a day

- Plavix 75 mg by mouth daily

- multivitamin one tablet by mouth daily

- nystatin swish and spit three times a day

- acetaminophen 500 mg by mouth every 4 hours as needed

- allopurinol 300 mg by mouth daily

- artificial tears four times a day as needed

- aspirin 81 mg by mouth daily

- Lipitor 40 mg by mouth daily

- Butrans transdermal 20 mcg weekly

- calcium with vitamin D by mouth daily

- Coreg 12.5 mg by mouth twice a day

- Humalog 10 units subcutaneous every evening

- Tradjenta 5 mg by mouth daily

- losartan 50 mg by mouth daily

- omeprazole 20 mg by mouth twice a day

- PreserVision by mouth twice a day

- Restasis eye drops twice a day

- Lantus 20 units subcutaneous daily

 

DISCHARGE INSTRUCTIONS:  Please see primary care provider in 7 days.  Please see

neurologist in 14 days.  Vascular followup as per neurology.  Return to the

hospital if symptoms worsen.  Physical therapy, occupational therapy (PT/OT).

Encourage oral intake.  Consistent carbohydrate pureed diet with Ensure

supplementation as recommended by speech and swallow.  Return to the hospital if

symptoms worsen.

 

Patient discharged to [facility].